# Patient Record
Sex: MALE | Race: WHITE | NOT HISPANIC OR LATINO | URBAN - METROPOLITAN AREA
[De-identification: names, ages, dates, MRNs, and addresses within clinical notes are randomized per-mention and may not be internally consistent; named-entity substitution may affect disease eponyms.]

---

## 2024-02-19 DIAGNOSIS — N50.82 SCROTAL PAIN: ICD-10-CM

## 2024-02-19 DIAGNOSIS — N45.1 EPIDIDYMITIS: Primary | ICD-10-CM

## 2024-02-19 RX ORDER — MELOXICAM 15 MG/1
15 TABLET ORAL DAILY
Qty: 30 TABLET | Refills: 1 | Status: SHIPPED | OUTPATIENT
Start: 2024-02-19 | End: 2024-04-19

## 2024-02-19 RX ORDER — LEVOFLOXACIN 500 MG/1
500 TABLET, FILM COATED ORAL DAILY
Qty: 10 TABLET | Refills: 0 | Status: SHIPPED | OUTPATIENT
Start: 2024-02-19 | End: 2024-02-29

## 2024-06-17 ENCOUNTER — APPOINTMENT (OUTPATIENT)
Dept: ORTHOPEDIC SURGERY | Facility: CLINIC | Age: 24
End: 2024-06-17
Payer: COMMERCIAL

## 2024-06-17 DIAGNOSIS — S92.352A CLOSED FRACTURE OF BASE OF FIFTH METATARSAL BONE OF LEFT FOOT, INITIAL ENCOUNTER: Primary | ICD-10-CM

## 2024-06-17 PROCEDURE — 99204 OFFICE O/P NEW MOD 45 MIN: CPT | Performed by: EMERGENCY MEDICINE

## 2024-06-17 ASSESSMENT — PAIN - FUNCTIONAL ASSESSMENT: PAIN_FUNCTIONAL_ASSESSMENT: 0-10

## 2024-06-17 ASSESSMENT — PAIN SCALES - GENERAL: PAINLEVEL_OUTOF10: 0 - NO PAIN

## 2024-06-17 NOTE — PROGRESS NOTES
Subjective    Patient ID: Darci Eastman is a 24 y.o. male.    Chief Complaint: Fracture of the Left Foot (NVP - jumped and didn't land odd but felt a sharp pain.  )     Last Surgery: No surgery found  Last Surgery Date: No surgery found    Darci is a very pleasant 24-year-old male who goes to school at [employer] and plays ultimate Frisbee.  He is coming in with some acute on chronic symptoms in his left foot.  He states that all of this began with an injury back in October.  At that time he had x-rays that revealed a possible fracture to his proximal left fifth metatarsal.  He was not really having much pain at that time and he was not immobilized or treated for 1/5 metatarsal fracture.  He then had repeat imaging done after another injury back in January of this year and the radiologist remarked that he had what appeared to be a remote Neff fracture involving his left fifth metatarsal.  Because of the chronicity, the emergency medicine provider reportedly told him that the fracture was old and that there was no acute intervention needed.  Again he was not immobilized and instead followed up with his school .  Since that time he has been doing some physical therapy and taping.  He has pain especially with playing on hard surfaces that is located over the base of the fifth metatarsal.  Last Friday, on 6/14/2024, he jumped up and landed awkwardly on his foot and had sudden onset sharp pain at that same site along the left fifth metatarsal.  He had x-rays performed in urgent care and was placed in a boot and told to be nonweightbearing after the diagnosed him again with a proximal left fifth metatarsal fracture.  He is here today with his dad who is helping provide additional history.  He has some mild bruising and swelling and still has some pain at the fracture site but states that overall he is feeling better.  He is leaving for [place] on Wednesday for 1 week for vacation.        Objective    Ortho Exam    Image Results:  Both ankles are unremarkable.  The right foot is unremarkable.  The left foot demonstrates some mild swelling especially over the dorsal lateral aspect and he also has some faint bruising seen over the lateral aspect of the left foot.  He is tender along the proximal fifth metatarsal.  The Lisfranc joint is nontender and appears to be stable.  DP pulse intact.  Range of motion of the left ankle is intact and strength testing also seems to be intact but pain is reproduced with testing strength against resistance dorsiflexion and testing his peroneal muscles.  Skin intact.    Assessment/Plan   Encounter Diagnoses:  Closed fracture of base of fifth metatarsal bone of left foot, initial encounter    Orders Placed This Encounter    MR foot left wo IV contrast     No follow-ups on file.    We had a discussion about his treatment options and agreed to obtain an MRI to better assess the acuity of his injury and whether or not this is a Neff versus a metatarsal shaft fracture.  Depending on the results of the MRI he will either follow-up with me for nonoperative management or I will likely refer him to Dr. Shaw.  In the meantime the patient will remain in the boot and will be nonweightbearing until after his MRI.    ** Please excuse any errors in grammar or translation related to this dictation. Voice recognition software was utilized to prepare this document. **       Carl Chan MD  [facility]

## 2024-06-18 ENCOUNTER — HOSPITAL ENCOUNTER (OUTPATIENT)
Dept: RADIOLOGY | Facility: HOSPITAL | Age: 24
Discharge: HOME | End: 2024-06-18
Payer: COMMERCIAL

## 2024-06-18 DIAGNOSIS — S92.352A CLOSED FRACTURE OF BASE OF FIFTH METATARSAL BONE OF LEFT FOOT, INITIAL ENCOUNTER: ICD-10-CM

## 2024-06-18 PROCEDURE — 73718 MRI LOWER EXTREMITY W/O DYE: CPT | Mod: LEFT SIDE | Performed by: RADIOLOGY

## 2024-06-18 PROCEDURE — 73718 MRI LOWER EXTREMITY W/O DYE: CPT | Mod: LT

## 2024-06-20 ENCOUNTER — TELEPHONE (OUTPATIENT)
Dept: ORTHOPEDIC SURGERY | Facility: CLINIC | Age: 24
End: 2024-06-20
Payer: COMMERCIAL

## 2024-06-28 ENCOUNTER — HOSPITAL ENCOUNTER (OUTPATIENT)
Dept: RADIOLOGY | Facility: CLINIC | Age: 24
Discharge: HOME | End: 2024-06-28
Payer: COMMERCIAL

## 2024-06-28 ENCOUNTER — OFFICE VISIT (OUTPATIENT)
Dept: ORTHOPEDIC SURGERY | Facility: CLINIC | Age: 24
End: 2024-06-28
Payer: COMMERCIAL

## 2024-06-28 DIAGNOSIS — S92.352A CLOSED FRACTURE OF BASE OF FIFTH METATARSAL BONE OF LEFT FOOT, INITIAL ENCOUNTER: ICD-10-CM

## 2024-06-28 DIAGNOSIS — S92.352A CLOSED FRACTURE OF BASE OF FIFTH METATARSAL BONE OF LEFT FOOT, INITIAL ENCOUNTER: Primary | ICD-10-CM

## 2024-06-28 PROBLEM — S92.353A CLOSED FRACTURE OF BASE OF FIFTH METATARSAL BONE: Status: ACTIVE | Noted: 2024-06-28

## 2024-06-28 PROCEDURE — 73630 X-RAY EXAM OF FOOT: CPT | Mod: LT

## 2024-06-28 PROCEDURE — 99214 OFFICE O/P EST MOD 30 MIN: CPT | Performed by: STUDENT IN AN ORGANIZED HEALTH CARE EDUCATION/TRAINING PROGRAM

## 2024-06-28 PROCEDURE — 99204 OFFICE O/P NEW MOD 45 MIN: CPT | Performed by: STUDENT IN AN ORGANIZED HEALTH CARE EDUCATION/TRAINING PROGRAM

## 2024-06-28 RX ORDER — SODIUM CHLORIDE, SODIUM LACTATE, POTASSIUM CHLORIDE, CALCIUM CHLORIDE 600; 310; 30; 20 MG/100ML; MG/100ML; MG/100ML; MG/100ML
100 INJECTION, SOLUTION INTRAVENOUS CONTINUOUS
OUTPATIENT
Start: 2024-06-28

## 2024-06-28 RX ORDER — CEFAZOLIN SODIUM 2 G/100ML
2 INJECTION, SOLUTION INTRAVENOUS ONCE
OUTPATIENT
Start: 2024-06-28 | End: 2024-06-28

## 2024-06-28 ASSESSMENT — PAIN - FUNCTIONAL ASSESSMENT: PAIN_FUNCTIONAL_ASSESSMENT: 0-10

## 2024-06-28 ASSESSMENT — PAIN SCALES - GENERAL: PAINLEVEL_OUTOF10: 0 - NO PAIN

## 2024-06-28 NOTE — PROGRESS NOTES
ORTHOPAEDIC SURGERY HISTORY & PHYSICAL     Chief Complaint:  Left foot pain    History Of Present Illness  Darci Eastman is a 24 y.o. male who presents for evaluation of left foot pain as a referral from Dr. Chan.  Patient initially sustained an injury to his left foot and October 2023.  Portions of the history are provided both by the patient as well as EMR and review of care everywhere.  This was treated conservatively.  Patient was later seen in January 2024 with mild persistent pain.  He was then seen on 06/17/2024 for acute on chronic left foot pain.  He sustained a awkward landing while playing ultimate Frisbee from 06/14/2024.  He actually thinks he was going up for the [id number] when he noticed pain.  He was seen in an urgent care setting time and made nonweightbearing in his left lower extremity in a fracture boot.    Patient was intermittently treated for presumed peroneal tendinitis after attending a walk-in therapy student clinic at [employer].  He has been able to continue playing but has noticed a nagging pain.  He has been able to play through the pain.    He currently reports minimal with pain nonweightbearing.  He has been compliant with nonweightbearing restrictions in a tall walking boot.  He has had a recent MRI and is here for review.    Patient does not use tobacco products.  He has recently graduated and is on the job hunt.     Past Medical History  No past medical history on file.    Surgical History  Recent Surgeries in Orthopaedic Surgery            No cases to display             Social History  Social History     Socioeconomic History    Marital status: Single     Spouse name: Not on file    Number of children: Not on file    Years of education: Not on file    Highest education level: Not on file   Occupational History    Not on file   Tobacco Use    Smoking status: Not on file    Smokeless tobacco: Not on file   Substance and Sexual Activity    Alcohol use: Not on file    Drug use: Not  on file    Sexual activity: Not on file   Other Topics Concern    Not on file   Social History Narrative    Not on file     Social Determinants of Health     Financial Resource Strain: Not on file   Food Insecurity: Not on file   Transportation Needs: Not on file   Physical Activity: Not on file   Stress: Not on file   Social Connections: Not on file   Intimate Partner Violence: Not on file   Housing Stability: Not on file       Family History  No family history on file.     Allergies  No Known Allergies    Review of Systems  REVIEW OF SYSTEMS  Constitutional: no unplanned weight loss  Psychiatric: no suicidal ideation  ENT: no vision changes, no sinus problems  Pulmonary: no shortness of breath  Lymphatic: no enlarged lymph nodes  Cardiovascular: no chest pain or shortness of breath  Gastrointestinal: no stomach problems  Genitourinary: no dysuria   Skin: no rashes  Endocrine: no thyroid problems  Neurological: no headache, no numbness  Hematological: no easy bruising  Musculoskeletal: Left foot pain    Physical Exam  PHYSICAL EXAMINATION  Constitutional Exam: well developed and well nourished  Psychiatric Exam: alert and oriented, appropriate mood and behavior  Eye Exam: EOMI  Pulmonary Exam: breathing non-labored, no apparent distress  Lymphatic exam: no appreciable lymphadenopathy in the lower extremities  Cardiovascular exam: RRR to peripheral palpation, DP pulses 2+, PT 2+, toes are pink with good capillary refill, no pitting edema  Skin exam: no open lesions, rashes, abrasions or ulcerations  Neurological exam: sensation to light touch intact in both lower extremities in peripheral and dermatomal distributions (except for any abnormalities noted in musculoskeletal exam)    Musculoskeletal exam: Left lower extremity examination.  Pain localized to the lateral aspect of the fifth metatarsal.  He has obvious bossing and focal tenderness to palpation.  Patient has physiologic hindfoot valgus with pes planus arch  posture and no significant forefoot deformity noted.  Patient has pain-free and supple ankle, subtalar midtarsal joint range of motion.  Patient has sensation intact to light touch grossly in a saphenous, sural, superficial peroneal, deep peroneal and tibial nerve distribution. He has intact PF/DF/EHL. 2+ Dp/pt pulses.    Last Recorded Vitals  There were no vitals taken for this visit.    Laboratory Results    No results found for this or any previous visit (from the past 24 hour(s)).    Radiology Results   X-ray imaging 3 view weightbearing left foot reviewed from 06/20/2024 and independently evaluated by me demonstrates complete and nondisplaced fifth metatarsal fracture with presumed periosteal reaction of the lateral cortex indicative of stress injury.    MRI left foot without contrast reviewed from 06/18/2024 and independently evaluated by me demonstrates incomplete proximal fifth metatarsal fracture with diffuse marrow signal.    Assessment/Plan:  24-year-old male who my impression has left closed complete nondisplaced fifth metatarsal fracture, likely sequela of prior stress injury with recent exacerbation.  I have reviewed the diagnosis and treatment options extensively with the patient.  I recommend the patient continue nonweightbearing in his left lower extremity.  The patient has failed an exhaustive and appropriate course of nonoperative treatment including previous attempts for immobilization without healing and persistent and nagging pain.  I would recommend the patient undergo left ORIF fifth metatarsal with bone grafting.  I reviewed the risk, benefits and alternatives to surgery, risks include but are not limited to infection, wound healing complications, need for further surgery, persistent or worsening pain, postoperative stiffness, bleeding, blood loss requiring a blood transfusion, neurovascular injury, mal- or non-union, recurrent deformity, hardware failure, hardware pain, failure of the  procedure, complications of anesthesia, stroke, DVT/PE, myocardial infarction and death. Benefits included decreased pain, improved function and improved likelihood of fracture healing. Alternatives included continued conservative management likely with a bone stimulator. The patient voiced understanding of the risks, benefits and alternatives and agreed to go forward with the stated plan.    Surgical Discussion/Plan    We discussed the diagnosis, prognosis, and all treatment options including those non-operative and surgical, along with respective risks/benefits/recovery.  The patient will benefit from surgical reduction and repair/ORIF of this injury for optimal return to function/activity.  The patient would like to pursue surgical intervention.     We discussed the in's and out's of surgery.  It would be done as ambulatory/SDS status, anesthesia: Regional + GETA, and pre-op testing: PAT visit to be setup.     A significant amount of time was also spent in counseling and coordination of care activities, which specifically included discussing/counseling the patient/family on the above specifics of surgery/risks/benefits/goals/expectations/recovery process and alternative treatments as detailed above and any other necessary post-op planning needed to ensure excellent patient care and patient safety.    Jamshid Montgomery MD, LAURA  Department of Orthopaedic Surgery  [facility]

## 2024-06-28 NOTE — LETTER
June 28, 2024     Cindi Laird MD  [address]  [address]  [facility] [address]    Patient: Darci Eastman   YOB: 2000   Date of Visit: 6/28/2024       Dear Dr. Cindi Laird MD:    Thank you for referring Darci Eastman to me for evaluation. Below are my notes for this consultation.  If you have questions, please do not hesitate to call me. I look forward to following your patient along with you.       Sincerely,     Jamshid Montgomery MD      CC: Carl Chan MD  ______________________________________________________________________________________    ORTHOPAEDIC SURGERY HISTORY & PHYSICAL     Chief Complaint:  Left foot pain    History Of Present Illness  Darci Eastman is a 24 y.o. male who presents for evaluation of left foot pain as a referral from Dr. Chan.  Patient initially sustained an injury to his left foot and October 2023.  Portions of the history are provided both by the patient as well as EMR and review of care everywhere.  This was treated conservatively.  Patient was later seen in January 2024 with mild persistent pain.  He was then seen on 06/17/2024 for acute on chronic left foot pain.  He sustained a awkward landing while playing ultimate Frisbee from 06/14/2024.  He actually thinks he was going up for the [employer] when he noticed pain.  He was seen in an urgent care setting time and made nonweightbearing in his left lower extremity in a fracture boot.    Patient was intermittently treated for presumed peroneal tendinitis after attending a walk-in therapy student clinic at [employer] [facility].  He has been able to continue playing but has noticed a nagging pain.  He has been able to play through the pain.    He currently reports minimal with pain nonweightbearing.  He has been compliant with nonweightbearing restrictions in a tall walking boot.  He has had a recent MRI and is here for review.    Patient does not use tobacco products.  He has recently graduated and  is on the job hunt.     Past Medical History  No past medical history on file.    Surgical History  Recent Surgeries in Orthopaedic Surgery            No cases to display             Social History  Social History     Socioeconomic History   • Marital status: Single     Spouse name: Not on file   • Number of children: Not on file   • Years of education: Not on file   • Highest education level: Not on file   Occupational History   • Not on file   Tobacco Use   • Smoking status: Not on file   • Smokeless tobacco: Not on file   Substance and Sexual Activity   • Alcohol use: Not on file   • Drug use: Not on file   • Sexual activity: Not on file   Other Topics Concern   • Not on file   Social History Narrative   • Not on file     Social Determinants of Health     Financial Resource Strain: Not on file   Food Insecurity: Not on file   Transportation Needs: Not on file   Physical Activity: Not on file   Stress: Not on file   Social Connections: Not on file   Intimate Partner Violence: Not on file   Housing Stability: Not on file       Family History  No family history on file.     Allergies  No Known Allergies    Review of Systems  REVIEW OF SYSTEMS  Constitutional: no unplanned weight loss  Psychiatric: no suicidal ideation  ENT: no vision changes, no sinus problems  Pulmonary: no shortness of breath  Lymphatic: no enlarged lymph nodes  Cardiovascular: no chest pain or shortness of breath  Gastrointestinal: no stomach problems  Genitourinary: no dysuria   Skin: no rashes  Endocrine: no thyroid problems  Neurological: no headache, no numbness  Hematological: no easy bruising  Musculoskeletal: Left foot pain    Physical Exam  PHYSICAL EXAMINATION  Constitutional Exam: well developed and well nourished  Psychiatric Exam: alert and oriented, appropriate mood and behavior  Eye Exam: EOMI  Pulmonary Exam: breathing non-labored, no apparent distress  Lymphatic exam: no appreciable lymphadenopathy in the lower  extremities  Cardiovascular exam: RRR to peripheral palpation, DP pulses 2+, PT 2+, toes are pink with good capillary refill, no pitting edema  Skin exam: no open lesions, rashes, abrasions or ulcerations  Neurological exam: sensation to light touch intact in both lower extremities in peripheral and dermatomal distributions (except for any abnormalities noted in musculoskeletal exam)    Musculoskeletal exam: Left lower extremity examination.  Pain localized to the lateral aspect of the fifth metatarsal.  He has obvious bossing and focal tenderness to palpation.  Patient has physiologic hindfoot valgus with pes planus arch posture and no significant forefoot deformity noted.  Patient has pain-free and supple ankle, subtalar midtarsal joint range of motion.  Patient has sensation intact to light touch grossly in a saphenous, sural, superficial peroneal, deep peroneal and tibial nerve distribution. He has intact PF/DF/EHL. 2+ Dp/pt pulses.    Last Recorded Vitals  There were no vitals taken for this visit.    Laboratory Results    No results found for this or any previous visit (from the past 24 hour(s)).    Radiology Results   X-ray imaging 3 view weightbearing left foot reviewed from 06/20/2024 and independently evaluated by me demonstrates complete and nondisplaced fifth metatarsal fracture with presumed periosteal reaction of the lateral cortex indicative of stress injury.    MRI left foot without contrast reviewed from 06/18/2024 and independently evaluated by me demonstrates incomplete proximal fifth metatarsal fracture with diffuse marrow signal.    Assessment/Plan:  24-year-old male who my impression has left closed complete nondisplaced fifth metatarsal fracture, likely sequela of prior stress injury with recent exacerbation.  I have reviewed the diagnosis and treatment options extensively with the patient.  I recommend the patient continue nonweightbearing in his left lower extremity.  The patient has failed  an exhaustive and appropriate course of nonoperative treatment including previous attempts for immobilization without healing and persistent and nagging pain.  I would recommend the patient undergo left ORIF fifth metatarsal with bone grafting.  I reviewed the risk, benefits and alternatives to surgery, risks include but are not limited to infection, wound healing complications, need for further surgery, persistent or worsening pain, postoperative stiffness, bleeding, blood loss requiring a blood transfusion, neurovascular injury, mal- or non-union, recurrent deformity, hardware failure, hardware pain, failure of the procedure, complications of anesthesia, stroke, DVT/PE, myocardial infarction and death. Benefits included decreased pain, improved function and improved likelihood of fracture healing. Alternatives included continued conservative management likely with a bone stimulator. The patient voiced understanding of the risks, benefits and alternatives and agreed to go forward with the stated plan.    Surgical Discussion/Plan    We discussed the diagnosis, prognosis, and all treatment options including those non-operative and surgical, along with respective risks/benefits/recovery.  The patient will benefit from surgical reduction and repair/ORIF of this injury for optimal return to function/activity.  The patient would like to pursue surgical intervention.     We discussed the in's and out's of surgery.  It would be done as ambulatory/SDS status, anesthesia: Regional + GETA, and pre-op testing: PAT visit to be setup.     A significant amount of time was also spent in counseling and coordination of care activities, which specifically included discussing/counseling the patient/family on the above specifics of surgery/risks/benefits/goals/expectations/recovery process and alternative treatments as detailed above and any other necessary post-op planning needed to ensure excellent patient care and patient  safety.    Jamshid Montgomery MD, LAURA  Department of Orthopaedic Surgery  [facility]

## 2024-07-08 ENCOUNTER — CLINICAL SUPPORT (OUTPATIENT)
Dept: PREADMISSION TESTING | Facility: HOSPITAL | Age: 24
End: 2024-07-08
Payer: COMMERCIAL

## 2024-07-08 DIAGNOSIS — S92.352A CLOSED FRACTURE OF BASE OF FIFTH METATARSAL BONE OF LEFT FOOT, INITIAL ENCOUNTER: ICD-10-CM

## 2024-07-08 RX ORDER — FERROUS SULFATE, DRIED 160(50) MG
1 TABLET, EXTENDED RELEASE ORAL DAILY
COMMUNITY

## 2024-07-10 ENCOUNTER — LAB (OUTPATIENT)
Dept: LAB | Facility: LAB | Age: 24
End: 2024-07-10
Payer: COMMERCIAL

## 2024-07-10 ENCOUNTER — TELEPHONE (OUTPATIENT)
Dept: ORTHOPEDIC SURGERY | Facility: HOSPITAL | Age: 24
End: 2024-07-10

## 2024-07-10 ENCOUNTER — PRE-ADMISSION TESTING (OUTPATIENT)
Dept: PREADMISSION TESTING | Facility: HOSPITAL | Age: 24
End: 2024-07-10
Payer: COMMERCIAL

## 2024-07-10 VITALS
DIASTOLIC BLOOD PRESSURE: 72 MMHG | RESPIRATION RATE: 18 BRPM | WEIGHT: 187.39 LBS | HEIGHT: 75 IN | SYSTOLIC BLOOD PRESSURE: 112 MMHG | HEART RATE: 75 BPM | TEMPERATURE: 98.4 F | OXYGEN SATURATION: 97 % | BODY MASS INDEX: 23.3 KG/M2

## 2024-07-10 DIAGNOSIS — Z01.818 PREOP TESTING: Primary | ICD-10-CM

## 2024-07-10 DIAGNOSIS — Z01.818 PREOP TESTING: ICD-10-CM

## 2024-07-10 LAB
BASOPHILS # BLD AUTO: 0.03 X10*3/UL (ref 0–0.1)
BASOPHILS NFR BLD AUTO: 0.8 %
EOSINOPHIL # BLD AUTO: 0.06 X10*3/UL (ref 0–0.7)
EOSINOPHIL NFR BLD AUTO: 1.6 %
ERYTHROCYTE [DISTWIDTH] IN BLOOD BY AUTOMATED COUNT: 11.7 % (ref 11.5–14.5)
HCT VFR BLD AUTO: 46 % (ref 41–52)
HGB BLD-MCNC: 15.4 G/DL (ref 13.5–17.5)
IMM GRANULOCYTES # BLD AUTO: 0 X10*3/UL (ref 0–0.7)
IMM GRANULOCYTES NFR BLD AUTO: 0 % (ref 0–0.9)
LYMPHOCYTES # BLD AUTO: 1.49 X10*3/UL (ref 1.2–4.8)
LYMPHOCYTES NFR BLD AUTO: 39.1 %
MCH RBC QN AUTO: 30 PG (ref 26–34)
MCHC RBC AUTO-ENTMCNC: 33.5 G/DL (ref 32–36)
MCV RBC AUTO: 90 FL (ref 80–100)
MONOCYTES # BLD AUTO: 0.48 X10*3/UL (ref 0.1–1)
MONOCYTES NFR BLD AUTO: 12.6 %
NEUTROPHILS # BLD AUTO: 1.75 X10*3/UL (ref 1.2–7.7)
NEUTROPHILS NFR BLD AUTO: 45.9 %
NRBC BLD-RTO: 0 /100 WBCS (ref 0–0)
PLATELET # BLD AUTO: 354 X10*3/UL (ref 150–450)
RBC # BLD AUTO: 5.14 X10*6/UL (ref 4.5–5.9)
WBC # BLD AUTO: 3.8 X10*3/UL (ref 4.4–11.3)

## 2024-07-10 PROCEDURE — 87081 CULTURE SCREEN ONLY: CPT | Mod: AHULAB | Performed by: PHYSICIAN ASSISTANT

## 2024-07-10 PROCEDURE — 85025 COMPLETE CBC W/AUTO DIFF WBC: CPT

## 2024-07-10 PROCEDURE — 99204 OFFICE O/P NEW MOD 45 MIN: CPT | Performed by: PHYSICIAN ASSISTANT

## 2024-07-10 PROCEDURE — 36415 COLL VENOUS BLD VENIPUNCTURE: CPT

## 2024-07-10 RX ORDER — CHLORHEXIDINE GLUCONATE ORAL RINSE 1.2 MG/ML
SOLUTION DENTAL
Qty: 473 ML | Refills: 0 | Status: SHIPPED | OUTPATIENT
Start: 2024-07-10

## 2024-07-10 ASSESSMENT — ENCOUNTER SYMPTOMS
CONSTITUTIONAL NEGATIVE: 1
ALLERGIC/IMMUNOLOGIC NEGATIVE: 1
CARDIOVASCULAR NEGATIVE: 1
HEMATOLOGIC/LYMPHATIC NEGATIVE: 1
NEUROLOGICAL NEGATIVE: 1
PSYCHIATRIC NEGATIVE: 1
ENDOCRINE NEGATIVE: 1
EYES NEGATIVE: 1
GASTROINTESTINAL NEGATIVE: 1
RESPIRATORY NEGATIVE: 1
MUSCULOSKELETAL NEGATIVE: 1

## 2024-07-10 NOTE — PREPROCEDURE INSTRUCTIONS
Medication List            Accurate as of July 10, 2024  1:23 PM. Always use your most recent med list.                calcium carbonate-vitamin D3 500 mg-5 mcg (200 unit) tablet  Medication Adjustments for Surgery: Continue until night before surgery                 Concerning above medication instructions - If medication is normally taken at night continue normal schedule - do not take night prior and morning of surgery.     CONTACT SURGEON'S OFFICE IF YOU DEVELOP:  * Fever = 100.4 F   * New respiratory symptoms (e.g. cough, shortness of breath, respiratory distress, sore throat)  * Recent loss of taste or smell  *Flu like symptoms such as headache, fatigue or gastrointestinal symptoms  * You develop any open sores, shingles, burning or painful urination   AND/OR:  * You no longer wish to have the surgery.  * Any other personal circumstances change that may lead to the need to cancel or defer this surgery.  *You were admitted to any hospital within one week of your planned procedure.    SMOKING:  *Quitting smoking can make a huge difference to your health and recovery from surgery.    *If you need help with quitting, call 2-453-QUIT-NOW.    THE DAY BEFORE SURGERY:  *Do not eat any food after midnight the night before surgery/procedure.   *You are permitted to drink clear liquids (i.e. water, black coffee/tea, (no milk or cream) apple juice, and electrolyte drinks (Gatorade)13.5 ounces up to 2 hours before your instructed arrival time to the hospital.  *You may chew gum until  2 hours before your surgery/procedure.    SURGICAL TIME  *You will be contacted between 2 p.m. and 6 p.m. the business day before your surgery with your arrival time.  *If you haven't received a call by 6pm, call [phone].  *Scheduled surgery times may change and you will be notified if this occurs-check your personal voicemail for any updates.    ON THE MORNING OF SURGERY:  *Wear comfortable, loose fitting clothing.   *Do not use  moisturizers, creams, lotions or perfume.  *All jewelry and valuables should be left at home.  *Prosthetic devices such as contact lenses, hearing aids, dentures, eyelash extensions, hairpins and body piercing must be removed before surgery.    BRING WITH YOU:  *Photo ID and insurance card  *Current list of medicines and allergies  *Pacemaker/Defibrillator/Heart stent cards  *CPAP machine and mask  *Slings/splints/crutches  *Copy of your complete Advanced Directive/DHPOA-if applicable  *Neurostimulator implant remote    PARKING AND ARRIVAL:  *Check in at the Main Entrance desk and let them know you are here for surgery.  *You will be directed to the 2nd floor surgical waiting area.    AFTER OUTPATIENT SURGERY:  *A responsible adult MUST accompany you at the time of discharge and stay with you for 24 hours after your surgery.  *You may NOT drive yourself home after surgery.  *You may use a taxi or ride sharing service ([employer], Uber) to return home ONLY if you are accompanied by a friend or family member.  *Instructions for resuming your medications will be provided by your surgeon.      Home Preoperative Antibacterial Shower     What is a home preoperative antibacterial shower?  This shower is a way of cleaning the skin with a germ killing soap before surgery.  The soap contains chlorhexidine, commonly known as CHG.  CHG is a soap for your skin with germ killing ability.  Let your doctor know if you are allergic to chlorhexidine.    Why do I need to take a preoperative antibacterial shower?  Skin is not sterile.  It is best to try to make your skin as free of germs as possible before surgery.  Proper cleansing with a germ killing soap before surgery can lower the number of germs on your skin.  This helps to reduce the risk of infection at the surgical site.  Following the instructions listed below will help you prepare your skin for surgery.      How do I use the CHG skin cleanser?  Steps:  Begin using your CHG soap  five days before your scheduled surgery on ________________________.    Days 1-4 Shower before bed:  Wash your face and genitals with your normal soap and rinse.    Wash and rinse your hair using the CHG soap. Rinse completely, do not condition your   Hair.          3.    Apply the CHG soap to a clean wet washcloth.  Turn the water off or move away                From the water spray to avoid premature rinsing of the CHG soap as you are applying.     4.   Lather your entire body from the neck down.  Do not use on your face or genitals.   Pay special attention to the area(s) where your incision(s) will be located unless they are on your face.  Avoid scrubbing your skin too hard.  The important point is to have the CHG soap sit on your skin for 3 minutes.    When the 3 minutes are up, turn on the water and rinse the CHG soap off your body completely.   Pat yourself dry with a clean, freshly-laundered towel.  Dress in clean, freshly laundered night clothes.    Be sure to sleep with clean, freshly laundered sheets.  Day 5:  Last shower is the morning before surgery: Follow above Instructions.    NOTE:    *Hair extensions should be removed    *Keep CHG soap out of eyes and ear canals   *DO NOT wash with regular soap on your body after you have used the CHG        soap solution  *DO NOT apply powders, lotions, or perfume.  *Deodorant may be used days 1-4, BUT NOT the day of surgery    Who should I contact if I have any questions regarding the use of CHG soap?  Call the [facility], Preadmission Testing at [phone] if you have any questions.              Patient Information: Pre-Operative Infection Prevention Measures     Why did I have my nose, under my arms and groin swabbed?  The purpose of the swab is to identify Staphylococcus aureus inside your nose or on your skin.  The swab was sent to the laboratory for culture.  A positive swab/culture for Staphylococcus aureus is called  colonization or carriage.      What is Staphylococcus aureus?  Staphylococcus aureus, also known as “staph”, is a germ found on the skin or in the nose of healthy people.  Sometimes Staphylococcus aureus can get into the body and cause an infection.  This can be minor (such as pimples, boils or other skin problems).  It might also be serious (such as blood infection, pneumonia or a surgical site infection).    What is Staphylococcus aureus colonization or carriage?  Colonization or carriage means that a person has the germ but is not sick from it.  These bacteria can be spread on the hands or when breathing or sneezing.    How is Staphylococcus aureus spread?  It is most often spread by close contact with a person or item that carries it.    What happens if my culture is positive for Staphylococcus aureus?  Your doctor/medical team will use this information to guide any antibiotic treatment which may be necessary.  Regardless of the culture results, we will clean the inside of your nose with a betadine swab just before you have your surgery.      Will I get an infection if I have Staphylococcus aureus in my nose or on my skin?  Anyone can get an infection with Staphylococcus aureus.  However, the best way to reduce your risk of infection is to follow the instructions provided to you for the use of your CHG soap and dental rinse.        Who should I contact if I have any questions?  Call the [facility], Preadmission Testing at [phone] if you have any questions.           Patient Information: Oral/Dental Rinse  **This is a prescription; pick it up at your preferred local pharmacy **  What is oral/dental rinse?   It is a mouthwash. It is a way of cleaning the mouth with a germ killing solution before your surgery.  The solution contains chlorhexidine, commonly known as CHG.   It is used inside the mouth to kill a bacteria known as Staphylococcus aureus.  Let your doctor know if you  are allergic to Chlorhexidine.    Why do I need to use CHG oral/dental rinse?  The CHG oral/dental rinse helps to kill a bacteria in your mouth known a Staphylococcus aureus.     This reduces the risk of infection at the surgical site.      Using your CHG oral/dental rinse  STEPS:  Use your CHG oral/dental rinse after you brush your teeth the night before (at bedtime) and the morning of your surgery.  Follow all directions on your prescription label.    Use the cap on the container to measure 15ml (fill cap to fill line)  Swish (gargle if you can) the mouthwash in your mouth for at least 30 seconds, (do not to swallow) spit out  After you use your CHG rinse, do not rinse your mouth with water, drink or eat.  Please refer to prescription label for the appropriate time to resume oral intake  Dental rinse comes in one size bottle: 473ml ~16oz.  You will have leftover    rinse, discard after this use.    What side effects might I have using the CHG oral/dental rinse?  CHG rinse will stick to plaque on the teeth.  Brush and floss just before use.  Teeth brushing will help avoid staining of plaque during use.    Who should I contact if I have questions about the CHG oral/dental rinse?  Please call [facility], Preadmission Testing at [phone] if you have any questions

## 2024-07-10 NOTE — CPM/PAT H&P
[facility] Evaluation       Name: Darci Eastman (Darci Eastman)  /Age: 2000/24 y.o.     In-Person       Chief Complaint: Closed fracture of base of fifth metatarsal bone of left foot, initial encounter     HPI      Date of Consult: 7/10/24    Referring Provider: Dr. Montgomery     Surgery, Date, and Length: Left Foot Fracture Open Reduction Internal Fixation; Calcaneal Bone Graft [place] - Left ; 7/15/24; 210 minutes     Darci Eastman  is a 24 year-old male who presents to the [facility] for perioperative risk assessment prior to surgery. Patient initially sustained an injury to his left foot and 2023.  This was treated conservatively.  Patient was later seen in 2024 with mild persistent pain.  He was then seen on 2024 for acute on chronic left foot pain.  He sustained a awkward landing while playing ultimate Frisbee from 2024.  He actually thinks he was going up for the [employer] when he noticed pain.  He was seen in an urgent care setting time and made nonweightbearing in his left lower extremity in a fracture boot. The patient has failed an exhaustive and appropriate course of nonoperative treatment including previous attempts for immobilization without healing and persistent and nagging pain.     This note was created in part upon personal review of patient's medical records.      Patient is scheduled to have Left Foot Fracture Open Reduction Internal Fixation; Calcaneal Bone Graft [place] - Left     Medical History  Past Medical History:   Diagnosis Date    Closed fracture of base of fifth metatarsal bone of left foot, initial encounter         STOP BANG =  1   (male)    Capisrraeli =  2    (surgery)    Surgical History  Past Surgical History:   Procedure Laterality Date    NO PAST SURGERIES               Family history:  Family History   Problem Relation Name Age of Onset    ALS Mother      Hypertension Father      No Known Problems Sister      No Known Problems Brother       "    Social history:  Social History     Socioeconomic History    Marital status: Single     Spouse name: Not on file    Number of children: Not on file    Years of education: Not on file    Highest education level: Not on file   Occupational History    Not on file   Tobacco Use    Smoking status: Never    Smokeless tobacco: Never   Vaping Use    Vaping status: Never Used   Substance and Sexual Activity    Alcohol use: Yes     Alcohol/week: 6.0 standard drinks of alcohol     Types: 6 Standard drinks or equivalent per week    Drug use: Yes     Types: Marijuana     Comment: last use April - no other drugs    Sexual activity: Defer   Other Topics Concern    Not on file   Social History Narrative    Not on file     Social Determinants of Health     Financial Resource Strain: Not on file   Food Insecurity: Not on file   Transportation Needs: Not on file   Physical Activity: Not on file   Stress: Not on file   Social Connections: Not on file   Intimate Partner Violence: Not on file   Housing Stability: Not on file          Current Outpatient Medications:     calcium carbonate-vitamin D3 500 mg-5 mcg (200 unit) tablet, Take 1 tablet by mouth once daily., Disp: , Rfl:     chlorhexidine (Peridex) 0.12 % solution, 15 ml swish and spit for 30 seconds night prior to surgery and morning of surgery, Disp: 473 mL, Rfl: 0       Visit Vitals  /72   Pulse 75   Temp 36.9 °C (98.4 °F)   Resp 18   Ht 1.905 m (6' 3\")   Wt 85 kg (187 lb 6.3 oz)   SpO2 97%   BMI 23.42 kg/m²   Smoking Status Never   BSA 2.12 m²              Review of Systems   Constitutional: Negative.    HENT: Negative.     Eyes: Negative.    Respiratory: Negative.     Cardiovascular: Negative.         METS >4    Gastrointestinal: Negative.    Endocrine: Negative.    Genitourinary: Negative.    Musculoskeletal: Negative.    Skin: Negative.    Allergic/Immunologic: Negative.    Neurological: Negative.    Hematological: Negative.    Psychiatric/Behavioral: Negative.   "        Physical Exam  Constitutional:       Appearance: Normal appearance.   HENT:      Head: Normocephalic and atraumatic.      Right Ear: External ear normal.      Left Ear: External ear normal.      Nose: Nose normal.      Mouth/Throat:      Pharynx: Oropharynx is clear.   Eyes:      Conjunctiva/sclera: Conjunctivae normal.   Cardiovascular:      Rate and Rhythm: Normal rate and regular rhythm.      Heart sounds: Normal heart sounds.   Pulmonary:      Effort: Pulmonary effort is normal.      Breath sounds: Normal breath sounds.   Abdominal:      General: Abdomen is flat.      Palpations: Abdomen is soft.   Musculoskeletal:         General: Normal range of motion.      Cervical back: Normal range of motion and neck supple.      Comments: Boot LLE   Skin:     General: Skin is warm and dry.   Neurological:      General: No focal deficit present.      Mental Status: He is alert and oriented to person, place, and time.   Psychiatric:         Mood and Affect: Mood normal.         Behavior: Behavior normal.         Thought Content: Thought content normal.         Judgment: Judgment normal.          PAT AIRWAY:   Airway:     Mallampati::  I    Neck ROM::  Full    Lab Results   Component Value Date    WBC 3.8 (L) 07/10/2024    HGB 15.4 07/10/2024    HCT 46.0 07/10/2024    MCV 90 07/10/2024     07/10/2024      RCRI  0  , 3.9 % Risk of MACE      Hematology       Patient instructed to ambulate as soon as possible postoperatively to decrease thromboembolic risk.      Initiate mechanical DVT prophylaxis as soon as possible and initiate chemical prophylaxis when deemed safe from a bleeding standpoint post surgery.       VTE prophylaxis per surgical team       Tests ordered in PAT: cbc  LAB REVIEWED: unremarkable    Risk assessment complete.  Patient is scheduled for a intermediate surgical risk procedure.        Preoperative medication instructions were provided and reviewed with the patient.  Any additional testing or  evaluation was explained to the patient.  Nothing by mouth instructions were discussed and patient's questions were answered prior to conclusion to this encounter.  Patient verbalized understanding of preoperative instructions given in preadmission testing; discharge instructions available in EMR.    This note was dictated by a speech recognition.  Minor errors may have been detected in a speech recognition.

## 2024-07-10 NOTE — TELEPHONE ENCOUNTER
I received notification the patient was requesting a call back in the lead up to his upcoming surgery.  I contacted the patient via the number listed in the chart.  I reviewed the typical postoperative recovery course, I would anticipate putting him into a nonweightbearing splint for soft tissue rest and pending wound check transitioning him into a nonweightbearing walking boot so that he may begin physical therapy for early range of motion.  We also discussed vitamin D and calcium supplementation, I will plan to provide him with a formal prescription following surgery.  I have encouraged the patient to contact the office with any further questions prior to surgery.    Jamshid Montgomery MD, LAURA  Department of Orthopaedic Surgery  [facility]

## 2024-07-10 NOTE — H&P (VIEW-ONLY)
[facility] Evaluation       Name: Darci Eastman (Darci Eastman)  /Age: 2000/24 y.o.     In-Person       Chief Complaint: Closed fracture of base of fifth metatarsal bone of left foot, initial encounter     HPI      Date of Consult: 7/10/24    Referring Provider: Dr. Montgomery     Surgery, Date, and Length: Left Foot Fracture Open Reduction Internal Fixation; Calcaneal Bone Graft [place] - Left ; 7/15/24; 210 minutes     Darci Eastman  is a 24 year-old male who presents to the [facility] for perioperative risk assessment prior to surgery. Patient initially sustained an injury to his left foot and 2023.  This was treated conservatively.  Patient was later seen in 2024 with mild persistent pain.  He was then seen on 2024 for acute on chronic left foot pain.  He sustained a awkward landing while playing ultimate Frisbee from 2024.  He actually thinks he was going up for the [employer] when he noticed pain.  He was seen in an urgent care setting time and made nonweightbearing in his left lower extremity in a fracture boot. The patient has failed an exhaustive and appropriate course of nonoperative treatment including previous attempts for immobilization without healing and persistent and nagging pain.     This note was created in part upon personal review of patient's medical records.      Patient is scheduled to have Left Foot Fracture Open Reduction Internal Fixation; Calcaneal Bone Graft [place] - Left     Medical History  Past Medical History:   Diagnosis Date    Closed fracture of base of fifth metatarsal bone of left foot, initial encounter         STOP BANG =  1   (male)    Capisrraeli =  2    (surgery)    Surgical History  Past Surgical History:   Procedure Laterality Date    NO PAST SURGERIES               Family history:  Family History   Problem Relation Name Age of Onset    ALS Mother      Hypertension Father      No Known Problems Sister      No Known Problems Brother       "    Social history:  Social History     Socioeconomic History    Marital status: Single     Spouse name: Not on file    Number of children: Not on file    Years of education: Not on file    Highest education level: Not on file   Occupational History    Not on file   Tobacco Use    Smoking status: Never    Smokeless tobacco: Never   Vaping Use    Vaping status: Never Used   Substance and Sexual Activity    Alcohol use: Yes     Alcohol/week: 6.0 standard drinks of alcohol     Types: 6 Standard drinks or equivalent per week    Drug use: Yes     Types: Marijuana     Comment: last use April - no other drugs    Sexual activity: Defer   Other Topics Concern    Not on file   Social History Narrative    Not on file     Social Determinants of Health     Financial Resource Strain: Not on file   Food Insecurity: Not on file   Transportation Needs: Not on file   Physical Activity: Not on file   Stress: Not on file   Social Connections: Not on file   Intimate Partner Violence: Not on file   Housing Stability: Not on file          Current Outpatient Medications:     calcium carbonate-vitamin D3 500 mg-5 mcg (200 unit) tablet, Take 1 tablet by mouth once daily., Disp: , Rfl:     chlorhexidine (Peridex) 0.12 % solution, 15 ml swish and spit for 30 seconds night prior to surgery and morning of surgery, Disp: 473 mL, Rfl: 0       Visit Vitals  /72   Pulse 75   Temp 36.9 °C (98.4 °F)   Resp 18   Ht 1.905 m (6' 3\")   Wt 85 kg (187 lb 6.3 oz)   SpO2 97%   BMI 23.42 kg/m²   Smoking Status Never   BSA 2.12 m²              Review of Systems   Constitutional: Negative.    HENT: Negative.     Eyes: Negative.    Respiratory: Negative.     Cardiovascular: Negative.         METS >4    Gastrointestinal: Negative.    Endocrine: Negative.    Genitourinary: Negative.    Musculoskeletal: Negative.    Skin: Negative.    Allergic/Immunologic: Negative.    Neurological: Negative.    Hematological: Negative.    Psychiatric/Behavioral: Negative.   "        Physical Exam  Constitutional:       Appearance: Normal appearance.   HENT:      Head: Normocephalic and atraumatic.      Right Ear: External ear normal.      Left Ear: External ear normal.      Nose: Nose normal.      Mouth/Throat:      Pharynx: Oropharynx is clear.   Eyes:      Conjunctiva/sclera: Conjunctivae normal.   Cardiovascular:      Rate and Rhythm: Normal rate and regular rhythm.      Heart sounds: Normal heart sounds.   Pulmonary:      Effort: Pulmonary effort is normal.      Breath sounds: Normal breath sounds.   Abdominal:      General: Abdomen is flat.      Palpations: Abdomen is soft.   Musculoskeletal:         General: Normal range of motion.      Cervical back: Normal range of motion and neck supple.      Comments: Boot LLE   Skin:     General: Skin is warm and dry.   Neurological:      General: No focal deficit present.      Mental Status: He is alert and oriented to person, place, and time.   Psychiatric:         Mood and Affect: Mood normal.         Behavior: Behavior normal.         Thought Content: Thought content normal.         Judgment: Judgment normal.          PAT AIRWAY:   Airway:     Mallampati::  I    Neck ROM::  Full    Lab Results   Component Value Date    WBC 3.8 (L) 07/10/2024    HGB 15.4 07/10/2024    HCT 46.0 07/10/2024    MCV 90 07/10/2024     07/10/2024      RCRI  0  , 3.9 % Risk of MACE      Hematology       Patient instructed to ambulate as soon as possible postoperatively to decrease thromboembolic risk.      Initiate mechanical DVT prophylaxis as soon as possible and initiate chemical prophylaxis when deemed safe from a bleeding standpoint post surgery.       VTE prophylaxis per surgical team       Tests ordered in PAT: cbc  LAB REVIEWED: unremarkable    Risk assessment complete.  Patient is scheduled for a intermediate surgical risk procedure.        Preoperative medication instructions were provided and reviewed with the patient.  Any additional testing or  evaluation was explained to the patient.  Nothing by mouth instructions were discussed and patient's questions were answered prior to conclusion to this encounter.  Patient verbalized understanding of preoperative instructions given in preadmission testing; discharge instructions available in EMR.    This note was dictated by a speech recognition.  Minor errors may have been detected in a speech recognition.

## 2024-07-12 ENCOUNTER — TELEPHONE (OUTPATIENT)
Dept: PREADMISSION TESTING | Facility: HOSPITAL | Age: 24
End: 2024-07-12
Payer: COMMERCIAL

## 2024-07-12 LAB — STAPHYLOCOCCUS SPEC CULT: ABNORMAL

## 2024-07-12 RX ORDER — MUPIROCIN 20 MG/G
OINTMENT TOPICAL 2 TIMES DAILY
Qty: 44 G | Refills: 0 | Status: SHIPPED | OUTPATIENT
Start: 2024-07-12 | End: 2024-07-22

## 2024-07-15 ENCOUNTER — APPOINTMENT (OUTPATIENT)
Dept: RADIOLOGY | Facility: HOSPITAL | Age: 24
End: 2024-07-15
Payer: COMMERCIAL

## 2024-07-15 ENCOUNTER — HOSPITAL ENCOUNTER (OUTPATIENT)
Facility: HOSPITAL | Age: 24
Setting detail: OUTPATIENT SURGERY
Discharge: HOME | End: 2024-07-15
Attending: STUDENT IN AN ORGANIZED HEALTH CARE EDUCATION/TRAINING PROGRAM | Admitting: STUDENT IN AN ORGANIZED HEALTH CARE EDUCATION/TRAINING PROGRAM
Payer: COMMERCIAL

## 2024-07-15 ENCOUNTER — ANESTHESIA (OUTPATIENT)
Dept: OPERATING ROOM | Facility: HOSPITAL | Age: 24
End: 2024-07-15
Payer: COMMERCIAL

## 2024-07-15 ENCOUNTER — ANESTHESIA EVENT (OUTPATIENT)
Dept: OPERATING ROOM | Facility: HOSPITAL | Age: 24
End: 2024-07-15
Payer: COMMERCIAL

## 2024-07-15 VITALS
DIASTOLIC BLOOD PRESSURE: 91 MMHG | HEART RATE: 72 BPM | OXYGEN SATURATION: 100 % | BODY MASS INDEX: 23.3 KG/M2 | TEMPERATURE: 97.7 F | WEIGHT: 187.39 LBS | SYSTOLIC BLOOD PRESSURE: 166 MMHG | RESPIRATION RATE: 21 BRPM | HEIGHT: 75 IN

## 2024-07-15 DIAGNOSIS — S92.352G CLOSED FRACTURE OF BASE OF FIFTH METATARSAL BONE OF LEFT FOOT WITH DELAYED HEALING: ICD-10-CM

## 2024-07-15 DIAGNOSIS — S92.352A CLOSED FRACTURE OF BASE OF FIFTH METATARSAL BONE OF LEFT FOOT, INITIAL ENCOUNTER: Primary | ICD-10-CM

## 2024-07-15 DIAGNOSIS — G89.18 POSTOPERATIVE PAIN: ICD-10-CM

## 2024-07-15 DIAGNOSIS — S92.353A CLOSED FRACTURE OF BASE OF FIFTH METATARSAL BONE: ICD-10-CM

## 2024-07-15 PROCEDURE — 2780000003 HC OR 278 NO HCPCS: Performed by: STUDENT IN AN ORGANIZED HEALTH CARE EDUCATION/TRAINING PROGRAM

## 2024-07-15 PROCEDURE — 2500000005 HC RX 250 GENERAL PHARMACY W/O HCPCS: Performed by: NURSE ANESTHETIST, CERTIFIED REGISTERED

## 2024-07-15 PROCEDURE — 20900 REMOVAL OF BONE FOR GRAFT: CPT | Performed by: STUDENT IN AN ORGANIZED HEALTH CARE EDUCATION/TRAINING PROGRAM

## 2024-07-15 PROCEDURE — A28485 PR OPEN TREATMENT METATARSAL FRACTURE EACH: Performed by: NURSE ANESTHETIST, CERTIFIED REGISTERED

## 2024-07-15 PROCEDURE — 3700000002 HC GENERAL ANESTHESIA TIME - EACH INCREMENTAL 1 MINUTE: Performed by: STUDENT IN AN ORGANIZED HEALTH CARE EDUCATION/TRAINING PROGRAM

## 2024-07-15 PROCEDURE — 3600000004 HC OR TIME - INITIAL BASE CHARGE - PROCEDURE LEVEL FOUR: Performed by: STUDENT IN AN ORGANIZED HEALTH CARE EDUCATION/TRAINING PROGRAM

## 2024-07-15 PROCEDURE — 7100000010 HC PHASE TWO TIME - EACH INCREMENTAL 1 MINUTE: Performed by: STUDENT IN AN ORGANIZED HEALTH CARE EDUCATION/TRAINING PROGRAM

## 2024-07-15 PROCEDURE — 64445 NJX AA&/STRD SCIATIC NRV IMG: CPT | Performed by: STUDENT IN AN ORGANIZED HEALTH CARE EDUCATION/TRAINING PROGRAM

## 2024-07-15 PROCEDURE — 2500000005 HC RX 250 GENERAL PHARMACY W/O HCPCS: Performed by: STUDENT IN AN ORGANIZED HEALTH CARE EDUCATION/TRAINING PROGRAM

## 2024-07-15 PROCEDURE — 2500000004 HC RX 250 GENERAL PHARMACY W/ HCPCS (ALT 636 FOR OP/ED): Performed by: STUDENT IN AN ORGANIZED HEALTH CARE EDUCATION/TRAINING PROGRAM

## 2024-07-15 PROCEDURE — 3700000001 HC GENERAL ANESTHESIA TIME - INITIAL BASE CHARGE: Performed by: STUDENT IN AN ORGANIZED HEALTH CARE EDUCATION/TRAINING PROGRAM

## 2024-07-15 PROCEDURE — 7100000002 HC RECOVERY ROOM TIME - EACH INCREMENTAL 1 MINUTE: Performed by: STUDENT IN AN ORGANIZED HEALTH CARE EDUCATION/TRAINING PROGRAM

## 2024-07-15 PROCEDURE — 3600000009 HC OR TIME - EACH INCREMENTAL 1 MINUTE - PROCEDURE LEVEL FOUR: Performed by: STUDENT IN AN ORGANIZED HEALTH CARE EDUCATION/TRAINING PROGRAM

## 2024-07-15 PROCEDURE — 7100000009 HC PHASE TWO TIME - INITIAL BASE CHARGE: Performed by: STUDENT IN AN ORGANIZED HEALTH CARE EDUCATION/TRAINING PROGRAM

## 2024-07-15 PROCEDURE — C1713 ANCHOR/SCREW BN/BN,TIS/BN: HCPCS | Performed by: STUDENT IN AN ORGANIZED HEALTH CARE EDUCATION/TRAINING PROGRAM

## 2024-07-15 PROCEDURE — 2500000004 HC RX 250 GENERAL PHARMACY W/ HCPCS (ALT 636 FOR OP/ED): Performed by: NURSE ANESTHETIST, CERTIFIED REGISTERED

## 2024-07-15 PROCEDURE — 28485 OPTX METATARSAL FX EACH: CPT | Performed by: STUDENT IN AN ORGANIZED HEALTH CARE EDUCATION/TRAINING PROGRAM

## 2024-07-15 PROCEDURE — 7100000001 HC RECOVERY ROOM TIME - INITIAL BASE CHARGE: Performed by: STUDENT IN AN ORGANIZED HEALTH CARE EDUCATION/TRAINING PROGRAM

## 2024-07-15 PROCEDURE — 76000 FLUOROSCOPY <1 HR PHYS/QHP: CPT | Mod: LT

## 2024-07-15 PROCEDURE — A28485 PR OPEN TREATMENT METATARSAL FRACTURE EACH: Performed by: STUDENT IN AN ORGANIZED HEALTH CARE EDUCATION/TRAINING PROGRAM

## 2024-07-15 PROCEDURE — 2720000007 HC OR 272 NO HCPCS: Performed by: STUDENT IN AN ORGANIZED HEALTH CARE EDUCATION/TRAINING PROGRAM

## 2024-07-15 DEVICE — SCREW, BONE, T8 FULL THREAD, 2.7 X 18MM: Type: IMPLANTABLE DEVICE | Site: FOOT | Status: FUNCTIONAL

## 2024-07-15 DEVICE — SCREW, LOCKING, T8 FULL THREAD, 2.7 X 14MM: Type: IMPLANTABLE DEVICE | Site: FOOT | Status: FUNCTIONAL

## 2024-07-15 DEVICE — IMPLANTABLE DEVICE: Type: IMPLANTABLE DEVICE | Site: FOOT | Status: FUNCTIONAL

## 2024-07-15 RX ORDER — ONDANSETRON HYDROCHLORIDE 2 MG/ML
4 INJECTION, SOLUTION INTRAVENOUS ONCE AS NEEDED
Status: DISCONTINUED | OUTPATIENT
Start: 2024-07-15 | End: 2024-07-15 | Stop reason: HOSPADM

## 2024-07-15 RX ORDER — LIDOCAINE HYDROCHLORIDE 20 MG/ML
INJECTION, SOLUTION INFILTRATION; PERINEURAL AS NEEDED
Status: DISCONTINUED | OUTPATIENT
Start: 2024-07-15 | End: 2024-07-15

## 2024-07-15 RX ORDER — OXYCODONE HYDROCHLORIDE 5 MG/1
5 TABLET ORAL EVERY 4 HOURS PRN
Status: DISCONTINUED | OUTPATIENT
Start: 2024-07-15 | End: 2024-07-15 | Stop reason: HOSPADM

## 2024-07-15 RX ORDER — CEFAZOLIN SODIUM 2 G/100ML
2 INJECTION, SOLUTION INTRAVENOUS ONCE
Status: DISCONTINUED | OUTPATIENT
Start: 2024-07-15 | End: 2024-07-15 | Stop reason: HOSPADM

## 2024-07-15 RX ORDER — SODIUM CHLORIDE, SODIUM LACTATE, POTASSIUM CHLORIDE, CALCIUM CHLORIDE 600; 310; 30; 20 MG/100ML; MG/100ML; MG/100ML; MG/100ML
100 INJECTION, SOLUTION INTRAVENOUS CONTINUOUS
Status: DISCONTINUED | OUTPATIENT
Start: 2024-07-15 | End: 2024-07-15 | Stop reason: HOSPADM

## 2024-07-15 RX ORDER — DIPHENHYDRAMINE HYDROCHLORIDE 50 MG/ML
12.5 INJECTION INTRAMUSCULAR; INTRAVENOUS ONCE AS NEEDED
Status: DISCONTINUED | OUTPATIENT
Start: 2024-07-15 | End: 2024-07-15 | Stop reason: HOSPADM

## 2024-07-15 RX ORDER — SODIUM CHLORIDE 0.9 G/100ML
IRRIGANT IRRIGATION AS NEEDED
Status: DISCONTINUED | OUTPATIENT
Start: 2024-07-15 | End: 2024-07-15 | Stop reason: HOSPADM

## 2024-07-15 RX ORDER — DEXMEDETOMIDINE IN 0.9 % NACL 20 MCG/5ML
SYRINGE (ML) INTRAVENOUS AS NEEDED
Status: DISCONTINUED | OUTPATIENT
Start: 2024-07-15 | End: 2024-07-15

## 2024-07-15 RX ORDER — SODIUM CHLORIDE, SODIUM LACTATE, POTASSIUM CHLORIDE, CALCIUM CHLORIDE 600; 310; 30; 20 MG/100ML; MG/100ML; MG/100ML; MG/100ML
100 INJECTION, SOLUTION INTRAVENOUS CONTINUOUS
Status: CANCELLED | OUTPATIENT
Start: 2024-07-15

## 2024-07-15 RX ORDER — PROPOFOL 10 MG/ML
INJECTION, EMULSION INTRAVENOUS AS NEEDED
Status: DISCONTINUED | OUTPATIENT
Start: 2024-07-15 | End: 2024-07-15

## 2024-07-15 RX ORDER — LIDOCAINE HYDROCHLORIDE 10 MG/ML
0.1 INJECTION, SOLUTION EPIDURAL; INFILTRATION; INTRACAUDAL; PERINEURAL ONCE
Status: DISCONTINUED | OUTPATIENT
Start: 2024-07-15 | End: 2024-07-15 | Stop reason: HOSPADM

## 2024-07-15 RX ORDER — ASPIRIN 81 MG/1
81 TABLET ORAL 2 TIMES DAILY
Qty: 84 TABLET | Refills: 0 | Status: SHIPPED | OUTPATIENT
Start: 2024-07-15 | End: 2024-08-26

## 2024-07-15 RX ORDER — FENTANYL CITRATE 50 UG/ML
INJECTION, SOLUTION INTRAMUSCULAR; INTRAVENOUS AS NEEDED
Status: DISCONTINUED | OUTPATIENT
Start: 2024-07-15 | End: 2024-07-15

## 2024-07-15 RX ORDER — OXYCODONE AND ACETAMINOPHEN 5; 325 MG/1; MG/1
1 TABLET ORAL EVERY 6 HOURS PRN
Qty: 28 TABLET | Refills: 0 | Status: SHIPPED | OUTPATIENT
Start: 2024-07-15

## 2024-07-15 RX ORDER — BUPIVACAINE HCL/EPINEPHRINE 0.5-1:200K
VIAL (ML) INJECTION AS NEEDED
Status: DISCONTINUED | OUTPATIENT
Start: 2024-07-15 | End: 2024-07-15

## 2024-07-15 RX ORDER — PNV NO.95/FERROUS FUM/FOLIC AC 28MG-0.8MG
1 TABLET ORAL 2 TIMES DAILY
Qty: 60 TABLET | Refills: 11 | Status: SHIPPED | OUTPATIENT
Start: 2024-07-15 | End: 2025-07-15

## 2024-07-15 RX ORDER — MIDAZOLAM HYDROCHLORIDE 1 MG/ML
INJECTION, SOLUTION INTRAMUSCULAR; INTRAVENOUS AS NEEDED
Status: DISCONTINUED | OUTPATIENT
Start: 2024-07-15 | End: 2024-07-15

## 2024-07-15 RX ORDER — CEFAZOLIN 1 G/1
INJECTION, POWDER, FOR SOLUTION INTRAVENOUS AS NEEDED
Status: DISCONTINUED | OUTPATIENT
Start: 2024-07-15 | End: 2024-07-15

## 2024-07-15 RX ORDER — KETOROLAC TROMETHAMINE 30 MG/ML
INJECTION, SOLUTION INTRAMUSCULAR; INTRAVENOUS AS NEEDED
Status: DISCONTINUED | OUTPATIENT
Start: 2024-07-15 | End: 2024-07-15

## 2024-07-15 RX ORDER — LABETALOL HYDROCHLORIDE 5 MG/ML
5 INJECTION, SOLUTION INTRAVENOUS ONCE AS NEEDED
Status: DISCONTINUED | OUTPATIENT
Start: 2024-07-15 | End: 2024-07-15 | Stop reason: HOSPADM

## 2024-07-15 ASSESSMENT — PAIN - FUNCTIONAL ASSESSMENT
PAIN_FUNCTIONAL_ASSESSMENT: 0-10
PAIN_FUNCTIONAL_ASSESSMENT: 0-10
PAIN_FUNCTIONAL_ASSESSMENT: UNABLE TO SELF-REPORT
PAIN_FUNCTIONAL_ASSESSMENT: UNABLE TO SELF-REPORT
PAIN_FUNCTIONAL_ASSESSMENT: 0-10
PAIN_FUNCTIONAL_ASSESSMENT: 0-10

## 2024-07-15 ASSESSMENT — COLUMBIA-SUICIDE SEVERITY RATING SCALE - C-SSRS
6. HAVE YOU EVER DONE ANYTHING, STARTED TO DO ANYTHING, OR PREPARED TO DO ANYTHING TO END YOUR LIFE?: NO
2. HAVE YOU ACTUALLY HAD ANY THOUGHTS OF KILLING YOURSELF?: NO
1. IN THE PAST MONTH, HAVE YOU WISHED YOU WERE DEAD OR WISHED YOU COULD GO TO SLEEP AND NOT WAKE UP?: NO

## 2024-07-15 ASSESSMENT — PAIN SCALES - GENERAL
PAINLEVEL_OUTOF10: 0 - NO PAIN

## 2024-07-15 NOTE — ANESTHESIA PROCEDURE NOTES
Peripheral Block    Patient location during procedure: pre-op  Start time: 7/15/2024 11:40 AM  End time: 7/15/2024 11:41 AM  Reason for block: at surgeon's request and post-op pain management  Staffing  Performed: attending   Authorized by: Adebayo Arias MD    Performed by: Adebayo Arias MD  Preanesthetic Checklist  Completed: patient identified, IV checked, site marked, risks and benefits discussed, surgical consent, monitors and equipment checked, pre-op evaluation and timeout performed   Timeout performed at:   Peripheral Block  Patient position: laying flat  Prep: ChloraPrep and site prepped and draped  Patient monitoring: continuous pulse ox, heart rate and cardiac monitor  Block type: popliteal  Laterality: left  Injection technique: single-shot  Guidance: ultrasound guided  Local infiltration: lidocaine  Infiltration strength: 1 %  Dose: 3 mL  Needle  Needle type: short-bevel   Needle gauge: 22 G  Needle length: 8 cm  Needle localization: ultrasound guidance  Test dose: negative  Assessment  Injection assessment: negative aspiration for heme, local visualized surrounding nerve on ultrasound, no paresthesia on injection and incremental injection  Heart rate change: no  Slow fractionated injection: yes

## 2024-07-15 NOTE — OP NOTE
Left Foot Fracture ORIF; Calcaneal Bone Graft [place] (L) Operative Note     Date: 7/15/2024  OR Location: [facility] A OR    Name: Darci Eastman, : 2000, Age: 24 y.o., MRN: [id number], Sex: male    Diagnosis  Pre-op Diagnosis      * Closed fracture of base of fifth metatarsal bone of left foot, initial encounter [S92.352A] Post-op Diagnosis     * Closed fracture of base of fifth metatarsal bone of left foot with delayed healing [S92.352G]     Procedures  Left Foot Fracture ORIF; Calcaneal Bone Graft [place]  [address] - [place] OPEN TREATMENT METATARSAL FRACTURE EACH    Left Foot Fracture ORIF; Calcaneal Bone Graft [place]  [address] - [place] BONE GRAFT ANY DONOR AREA MINOR/SMALL      Surgeons      * Jamshid Montgomery - Primary    Resident/Fellow/Other Assistant:  Surgeons and Role:  * No surgeons found with a matching role *    Procedure Summary  Anesthesia: General  ASA: II  Anesthesia Staff: Anesthesiologist: Adebayo Arias MD  CRNA: PINO Fay-CRNA  Estimated Blood Loss: < 5 mL  Intra-op Medications:   Administrations occurring from 1100 to 1430 on 07/15/24:   Medication Name Total Dose   sodium chloride 0.9 % irrigation solution 1,000 mL              Anesthesia Record               Intraprocedure I/O Totals       None           Specimen: No specimens collected     Staff:   Circulator: Marian Kasper Person: Hayde Santo Scrub: Jena  Circulator: Sera         Drains and/or Catheters: * None in log *    Tourniquet Times:     Total Tourniquet Time Documented:  Leg (Left) - 114 minutes  Total: Leg (Left) - 114 minutes      Implants:  Implants       Type Name Action Serial No.      Screw PLATE, NARROW LOCKING, 2.7/4 HOLES, L 32MM - SNA - [id number] Implanted NA     Screw SCREW, BONE, T8 FULL THREAD, 2.7 X 18MM - SNA - [id number] Implanted NA     Screw SCREW, LOCKING, T8 FULL THREAD, 2.7 X 14MM - SNA - [id number] Implanted NA            Patient Name: Darci Eastman  MRN: [id number]  Date of Birth:  2000  Date of Service: 07/15/24  Report Type: Operative    PREOPERATIVE DIAGNOSIS:    Left Fifth Metatarsal Stress Fracture    POSTOPERATIVE DIAGNOSIS:    Left Fifth Metatarsal Fracture Non-Union    OPERATION/PROCEDURE:    1) Left Fifth Metatarsal Non-Union Fracture Repair  2) Left Calcaneal Bone Graft    SURGEON:  Jamshid Montgomery MD    ASSISTANT(S):  Brayan Carlton MD (PGY-III)    ANESTHESIA:  General    ESTIMATED BLOOD LOSS: <5 cc    COMPLICATIONS: None apparent    DISPOSITION: PACU/Home    BRIEF CLINICAL HISTORY: 24-year-old male who presented as a referral for left foot pain.  Patient has had a longstanding history of pain dating back to October 2023.  This was recently exacerbated after a fall while playing ultimate Frisbee from 06/14/2024.  By report, the patient thinks the pain started when he initiated jumping.  He has been having difficulty weightbearing but has been nonweightbearing in his left lower extremity since his injury.  On my evaluation of the patient.  He had pain that localized to the lateral aspect of the fifth metatarsal.  There was obvious bossing evident and tenderness to palpation.  Both review of his x-ray imaging as well as MRI demonstrated a complete but nondisplaced fracture of the fifth metatarsal, likely secondary to a new injury following a previous injury.  I had a long discussion with the patient regarding his treatment options.  From a nonsurgical treatment standpoint, he could certainly consider a period of immobilization with or without a bone stimulator.  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  I reviewed the risks, benefits and alternatives to surgery, risks include but were not limited to infection, wound healing complications, need for further surgery,  persistent or worsening pain, postoperative stiffness, bleeding, blood loss requiring blood transfusion, neurovascular injury, Mal or nonunion, recurrent deformity, hardware pain, hardware failure, failure the procedure, complications of anesthesia, stroke, DVT/PE, myocardial infarction and death.  Benefits included decreased pain and improved function as well as fracture healing.  Alternatives included continued conservative management, with or without a bone stimulator.  The patient voiced understanding the risk, benefits and alternatives and elected to proceed with surgery.  The informed consent was signed with both myself and the patient present.      OPERATIVE REPORT: On the day of surgery 07/15/2024, the patient was met in the preoperative holding area by members of the orthopedic, anesthesia and nursing teams.  I marked the patient's left lower extremity with indelible ink with the patient as my witness.  The informed consent was again reviewed.  All remaining questions were answered.  In the preoperative holding area, the anesthesia team performed a regional block. The patient had been previously medically optimized for surgery by ROMAIN.    The patient was then brought back to the operating room on [facility].  I led a preoperative timeout, verifying the correct patient, procedure and laterality of procedure to be performed.  We confirmed the appropriate availability of all surgical equipment,  implants, utilization of fluoroscopy and confirmed the administration of pre-surgical IV antibiotic prophylaxis within 1 hour of incision time, 2 g Ancef.  All present were in agreement.    Care was handed over to the anesthesia team who provided GETA.  The patient was then transferred onto the operating room table in the supine position.  All bony prominences were padded and an SCD was placed on the contra-lateral extremity. A nonsterile, well-padded thigh tourniquet was placed.  The patient's left lower extremity  was then prepped and draped in usual sterile fashion with sterile prep with ChloraPrep.    I let a preprocedure pause again verifying the correct patient, procedure and laterality of procedure to be performed.  All present were in agreement.  The extremity was exsanguinated with the use of an Esmarch and the tourniquet was inflated to 275 mmHg.  Beginning with the calcaneal bone graft harvest, I marked out an oblique incision parallel to but well plantar to the course of both the peroneal tendons and sural nerve.  I incised through skin and subcutaneous tissue and spread bluntly to bone.  The Arthrex 6 mm autograft harvester was then introduced into the calcaneus into cortically.  The plug was removed and a second pass was made.  Additional graft was procured with use of a rongeur.  Meticulous hemostasis was achieved and the incision was closed with 3-0 nylon in a simple interrupted fashion.    I marked a standard plantar lateral approach to the fifth metatarsal at the border of the glabrous and on glabrous skin.  I incised through skin and subcutaneous tissue.  Scissor dissection proceeded through the subcutaneous tissue in order to look out for the sural nerve which was not identified in the plane of dissection.  The abductor digiti minimi was bluntly dissected plantarly.  The fifth metatarsal fracture was then identified readily on the plantar aspect and confirmed by biplanar fluoroscopic imaging.  I performed minimal subperiosteal elevation to improve fracture visualization.  The fracture edges were freshened with the use of scalpel, curette and rongeur.  The sclerotic margins were then drilled with a K wire to encourage marrow egress.  The calcaneal autograft bone was then delivered to the residual voids of the plantar and lateral margins of the fracture and impacted into place with the use of a tamp.  Adequacy of bone grafting was confirmed by biplanar fluoroscopic imaging.    I then selected an appropriately  sized 4-hole [place] VariAx two 2.7 mm mini fragment plate.  This was anatomically under contoured to achieve compression at the fracture site.  The plate was then provisionally applied to the plantar lateral surface of the fifth metatarsal with K wires placed through joystick towers.  Plate position was confirmed on AP, oblique and lateral foot films.  Beginning with the second most proximal screw hole as an axilla under fluoroscopic guidance, I drilled, measured and secured a 2.7 mm fully threaded bicortical screw with excellent fixation to provisionally deliver the plate to bone. Then utilizing the oblong hole drilled eccentrically in order to achieve additional compression, again under fluoroscopic guidance, I drilled, measured and secured a fully threaded bicortical screw with excellent fixation.  Fixation was then completed proximally with a unicortical locking screw so as to avoid intra-articular penetration of the 5th TMTJ.  Finally fixation was completed distally with a bicortical locking screw.  Remaining bone graft was impacted at the fracture site.    Final fluoroscopic films including AP, lateral and oblique foot views were obtained demonstrating appropriate position, alignment and compression noted at the fracture site with appropriate screw length and trajectory without intra-articular penetration.    The wounds were copiously irrigated and closed in layers.  2-0 Vicryl was used for the deep layer with 3-0 Vicryl for the deep dermal layer and 3-0 nylon for skin.  The skin was cleansed and dried and dry sterile dressings were placed.  The sterile drapes were removed.  The patient was then placed into a well-padded short leg splint.  By this time, the tourniquet had been released and there was excellent reperfusion of the extremity with palpable 2+ DP/PT pulses.    All surgical counts were noted to be correct. The patient was then awoken from anesthesia without complication and transferred from the  operating room table onto the [facility] and then brought back to the PACU in stable condition.    Post-Operative Plan:   The patient will remain nonweightbearing in their left lower extremity in a short leg plaster splint aspirin.  They will begin  for DVT prophylaxis.  I have encouraged early mobilization and extremity elevation as tolerated.  I will plan to see the patient back in approximately 2 weeks for their first postoperative visit.    Complications: None apparent  Disposition: PACU - hemodynamically stable.  Condition: stable     Attending Attestation: I was present and scrubbed for the entire procedure.    Jamshid Montgomery MD, LAURA  Department of Orthopaedic Surgery  [facility]    Note dictated with NoteWagon software.  Completed without full type editing and sent to avoid delay.

## 2024-07-15 NOTE — ANESTHESIA PREPROCEDURE EVALUATION
Patient: Darci Eastman    Procedure Information       Date/Time: 07/15/24 1100    Procedure: Left Foot Fracture ORIF; Calcaneal Bone Graft [place] (Left: Foot)    Location: [facility] A OR 17 / Virtual [facility] A OR    Surgeons: Jamshid Montgomery MD            Relevant Problems   No relevant active problems       Clinical information reviewed:   Tobacco  Allergies  Meds   Med Hx  Surg Hx   Fam Hx  Soc Hx        NPO Detail:  NPO/Void Status  Carbohydrate Drink Given Prior to Surgery? : N  Date of Last Liquid: 07/15/24  Time of Last Liquid: 0000  Date of Last Solid: 07/14/24  Time of Last Solid: 2000  Last Intake Type: Clear fluids  Time of Last Void: 0900         Physical Exam    Airway  Mallampati: II  TM distance: >3 FB  Neck ROM: full     Cardiovascular   Rhythm: regular  Rate: normal     Dental    Pulmonary   Breath sounds clear to auscultation     Abdominal            Anesthesia Plan    History of general anesthesia?: yes  History of complications of general anesthesia?: no    ASA 2     general     intravenous induction   Anesthetic plan and risks discussed with patient.    Plan discussed with CRNA.

## 2024-07-15 NOTE — BRIEF OP NOTE
Date: 7/15/2024  OR Location: [facility] OR    Name: Darci Eastman, : 2000, Age: 24 y.o., MRN: [id number], Sex: male    Diagnosis  Pre-op Diagnosis      * Closed fracture of base of fifth metatarsal bone of left foot, initial encounter [S92.352A] Post-op Diagnosis     * Closed fracture of base of fifth metatarsal bone of left foot with delayed healing [S92.352G]     Procedures  Left Foot Fracture ORIF; Calcaneal Bone Graft [place]  [address] - [place] OPEN TREATMENT METATARSAL FRACTURE EACH    Left Foot Fracture ORIF; Calcaneal Bone Graft [place]  [address] - [place] BONE GRAFT ANY DONOR AREA MINOR/SMALL      Surgeons      * Jamshid Montgomery - Primary    Resident/Fellow/Other Assistant:  Surgeons and Role:  * No surgeons found with a matching role *    Procedure Summary  Anesthesia: General  ASA: II  Anesthesia Staff: Anesthesiologist: Adebayo Arias MD  CRNA: PINO Fay-CRNA  Estimated Blood Loss: <5mL  Intra-op Medications:   Administrations occurring from 1100 to 1430 on 07/15/24:   Medication Name Total Dose   sodium chloride 0.9 % irrigation solution 1,000 mL              Anesthesia Record               Intraprocedure I/O Totals       None           Specimen: No specimens collected     Staff:   Circulator: Marian Kasper Person: Hayde Santo Scrub: Jnea  Circulator: Sera          Findings: see operative report    Complications:  None; patient tolerated the procedure well.     Disposition: PACU - hemodynamically stable.  Condition: stable  Specimens Collected: No specimens collected    See operative report from same day.    Jamshid Montgomery MD, LAURA  Department of Orthopaedic Surgery  [facility]

## 2024-07-15 NOTE — ANESTHESIA PROCEDURE NOTES
Airway  Date/Time: 7/15/2024 12:06 PM  Urgency: elective    Airway not difficult    Staffing  Performed: CRNA   Authorized by: Adebayo Arias MD    Performed by: PINO Fay-RENEE  Patient location during procedure: OR    Indications and Patient Condition  Indications for airway management: anesthesia  Spontaneous Ventilation: absent  Sedation level: deep  Preoxygenated: yes  Patient position: sniffing  MILS maintained throughout  Mask difficulty assessment: 0 - not attempted    Final Airway Details  Final airway type: supraglottic airway      Successful airway: Size 4     Number of attempts at approach: 1    Additional Comments  I gel

## 2024-07-15 NOTE — DISCHARGE INSTRUCTIONS
"POST-OPERATIVE DISCHARGE INSTRUCTIONS:    ACTIVITY:    Non-Weightbearing LLE in a Short Leg Plaster Splint     You are advised to go home directly from the hospital or surgical center. Restrict your activities.    Return to school/work will be determined at next clinic visit, unless previously discussed with the surgical team     BLOOD CLOT PREVENTION:    To prevent blood clot formation, you have been prescribed ASA to be taken as prescribed until your surgeon or his Physician's Assistant (PA) states you can stop taking it.    Moving around and walking (with crutches) helps decrease the risk of blood clots. You must get up and \"move around\" once every hour, unless your are sleeping.    While you are sleeping and when you are not being active, continue to keep your leg elevated as much as possible.    It is common to have swelling in your feet after surgery for even a year afterwards, so the more you keep the leg elevated after surgery, the less swelling you will have later on.     GENERAL INSTRUCTIONS:  1.  Keep your surgical site elevated above your heart for at least 7 days or longer to prevent swelling (a good way to remember this is \"toes above nose\"). This will improve your comfort and your overall recovery following surgery.  Avoid pressure under the heel and keep the heel clear to avoid ulceration     2. Be alert for signs of infection including redness, streaking, odor, fever or chills. Be alert for excessive pain or bleeding and notify your surgeon immediately. Also, notify your surgeon of nausea, vomiting, or chills, numbness or weakness, bleeding, redness, swelling, pain, or drainage from surgical incision(s), bowel or bladder dysfunction, severe pain not relieved by pain medications, temperature greater than 101.0 F (38.3 C) degrees.    3. If you smoke (or have smoked within the last year), we strongly recommend that you do not smoke. This may lead to increased risk of wound infection and will decrease " your chances of healing (bone, soft tissue, etc).     WOUND INSTRUCTIONS:    Leave your Short Leg Plaster Splint until your post operative visit.  Keep it clean and dry.     Always keep the incision clean and dry until the staples/sutures are removed. If there is no drainage from the incision you should keep it open to air. If there is drainage from the incision you must keep it covered at all times until the drainage stops.    You may shower carefully (avoid falling) but the incision must be covered with Tegaderm or a water proof dressing so the wound does not get wet; once the staples/sutures are removed, the Tegaderm is no longer necessary and the wound may be washed with soap and water.     If you do not have sutures that need to be removed  then the incision should remain clean and dry for a minimum of 14 days.    Do not soak in a bath tub, hot tub, pool, lake or other body of water until atleast 21 days after your surgery and your incision is completely dry and healed. Or until approved by Dr. Montgomery.    If you have removable sutures (or staples) they will be removed in approximately 14-21 days (unless otherwise instructed) from the day of your surgery.     If you have a fiberglass cast or splint in place, please consider the following instructions:  1)  Elevate the extremity as much as possible.  2)  Keep the cast or splint clean and dry.  3)  Please call us if the cast becomes wet or dirty.  4)  If you are experiencing worsening pain or worsening swelling, please call.  5)  Itching can be bothersome.  You can try:  - Scratching the opposite limb in the same spot.  - Running air through the cast or splint with a blow dryer on cold.  - Do NOT stick anything inside the cast as it may become lodged    DIET:    Return to your regular diet as tolerated. Begin with light or bland foods. Drink plenty of fluids.      MEDICATIONS:    Resume all previous home medications at the previous prescribed dose and frequency  "unless otherwise noted    PAIN CONTROL:     For pain control, you have been prescribed medications.    Narcotic medication in the form of percocet. Take as prescribed and wean as able. Do not take with medications that are sedating, especially benzodiazepines.  - Take as prescribed for the first three days then stop when prescriptions done. This will provide a strong anti-inflammatory effect for the first few days.  2. The anesthesia block will wear off eventually. Some patients it will last only a few hours after surgery and in others it may last a few days. As soon as you start feeling any sensation at all in your leg, start taking the medications, so that you're not caught playing \"catch up\".   3. Remember to keep elevated. Avoid using ice while your extremity nerve block is still working. If you can't feel your leg and it is still numb, then the ice may induce injury. Ice behind the knee can help as well. You may apply ice to your cast/splint/dressing for the first few days. 20 minutes of icing followed by 2 hours of no ice. Keep the splint/cast/dressing dry and place a towel between the ice and the dressing. DO NOT GET YOUR DRESSING/SPLINT/CAST WET!  4. While taking narcotics, you may have constipation.  A stool softener is recommended. You may use an over-the-counter stool softener or use the one that has been prescribed for you. Colace and Senokot are common over-the-counter medications.  6. Do not drink alcohol or drive while using narcotic pain medication.  7. Do NOT take additional tylenol if your pain medication already has tylenol in it     IMPORTANT INFORMATION:    Please call your surgeon' s clinic with questions Monday-Friday during business hours. If no one answers, please leave a message and someone should get back to the patient within 24 hours.  For after-hours calls, please call the  and request to contact the answering service for Dr. Montgomery.   For emergencies, call 911 or present to the " nearest ER for immediate evaluation.     FOLLOWUP INSTRUCTIONS:    Please contact [phone] to confirm scheduled follow-up appointment or to make changes to your scheduled appointment.

## 2024-07-16 NOTE — ANESTHESIA POSTPROCEDURE EVALUATION
Patient: Darci Eastman    Procedure Summary       Date: 07/15/24 Room / Location: [facility] OR 17 / Virtual [facility] OR    Anesthesia Start: 1158 Anesthesia Stop: 1446    Procedure: Left Foot Fracture ORIF; Calcaneal Bone Graft [place] (Left: Foot) Diagnosis:       Closed fracture of base of fifth metatarsal bone of left foot with delayed healing      (Closed fracture of base of fifth metatarsal bone of left foot, initial encounter [S92.352A])    Surgeons: Jamshid Montgomery MD Responsible Provider: Adebayo Arias MD    Anesthesia Type: general ASA Status: 2            Anesthesia Type: general    Vitals Value Taken Time   /75 07/15/24 1531   Temp 36.5 °C (97.7 °F) 07/15/24 1445   Pulse 73 07/15/24 1543   Resp 16 07/15/24 1543   SpO2 100 % 07/15/24 1543   Vitals shown include unfiled device data.    Anesthesia Post Evaluation    Patient location during evaluation: bedside  Patient participation: complete - patient participated  Level of consciousness: awake  Pain management: adequate  Multimodal analgesia pain management approach  Airway patency: patent  Cardiovascular status: stable  Respiratory status: spontaneous ventilation and unassisted  Hydration status: acceptable  Postoperative Nausea and Vomiting: none  Comments: No significant PONV.        No notable events documented.

## 2024-07-31 ENCOUNTER — OFFICE VISIT (OUTPATIENT)
Dept: ORTHOPEDIC SURGERY | Facility: CLINIC | Age: 24
End: 2024-07-31
Payer: COMMERCIAL

## 2024-07-31 DIAGNOSIS — S92.352A CLOSED FRACTURE OF BASE OF FIFTH METATARSAL BONE OF LEFT FOOT, INITIAL ENCOUNTER: Primary | ICD-10-CM

## 2024-07-31 PROCEDURE — 99211 OFF/OP EST MAY X REQ PHY/QHP: CPT | Performed by: STUDENT IN AN ORGANIZED HEALTH CARE EDUCATION/TRAINING PROGRAM

## 2024-08-01 NOTE — PROGRESS NOTES
ORTHOPAEDIC SURGERY PROGRESS NOTE    Chief Complaint:  Left foot pain    History Of Present Illness  Darci Eastman is a 24 y.o. male who presents for evaluation of left foot pain as a referral from Dr. Chan.  Patient initially sustained an injury to his left foot and October 2023.  Portions of the history are provided both by the patient as well as EMR and review of care everywhere.  This was treated conservatively.  Patient was later seen in January 2024 with mild persistent pain.  He was then seen on 06/17/2024 for acute on chronic left foot pain.  He sustained a awkward landing while playing ultimate Frisbee from 06/14/2024.  He actually thinks he was going up for the [employer] when he noticed pain.  He was seen in an urgent care setting time and made nonweightbearing in his left lower extremity in a fracture boot.    Patient was intermittently treated for presumed peroneal tendinitis after attending a walk-in therapy student clinic at [employer].  He has been able to continue playing but has noticed a nagging pain.  He has been able to play through the pain.    He currently reports minimal with pain nonweightbearing.  He has been compliant with nonweightbearing restrictions in a tall walking boot.  He has had a recent MRI and is here for review.    Patient does not use tobacco products.  He has recently graduated and is on the job hunt.    07/31/2024: Patient returns s/p ORIF left fifth metatarsal fracture with calcaneus bone graft from 07/15/2024.  Patient has been compliant with nonweightbearing restrictions utilizing a knee scooter.  He has been on aspirin for DVT prophylaxis.  He has had noted improvement in his right foot pain from prior to surgery.  He has been mobilizing his right foot.  He is not reporting any numbness in his left foot.  He is maintained on aspirin for DVT prophylaxis and VitD/Ca.     Past Medical History  Past Medical History:   Diagnosis Date    Closed fracture of base of fifth  metatarsal bone of left foot, initial encounter        Surgical History  Recent Surgeries in Orthopaedic Surgery            No cases to display             Social History  Social History     Socioeconomic History    Marital status: Single   Tobacco Use    Smoking status: Never    Smokeless tobacco: Never   Vaping Use    Vaping status: Never Used   Substance and Sexual Activity    Alcohol use: Yes     Alcohol/week: 6.0 standard drinks of alcohol     Types: 6 Standard drinks or equivalent per week    Drug use: Yes     Types: Marijuana     Comment: last use April - no other drugs    Sexual activity: Defer       Family History  Family History   Problem Relation Name Age of Onset    ALS Mother      Hypertension Father      No Known Problems Sister      No Known Problems Brother          Allergies  No Known Allergies    Review of Systems  REVIEW OF SYSTEMS  Constitutional: no unplanned weight loss  Psychiatric: no suicidal ideation  ENT: no vision changes, no sinus problems  Pulmonary: no shortness of breath  Lymphatic: no enlarged lymph nodes  Cardiovascular: no chest pain or shortness of breath  Gastrointestinal: no stomach problems  Genitourinary: no dysuria   Skin: no rashes  Endocrine: no thyroid problems  Neurological: no headache, no numbness  Hematological: no easy bruising  Musculoskeletal: Left foot pain    Physical Exam  PHYSICAL EXAMINATION  Constitutional Exam: well developed and well nourished  Psychiatric Exam: alert and oriented, appropriate mood and behavior  Eye Exam: EOMI  Pulmonary Exam: breathing non-labored, no apparent distress  Lymphatic exam: no appreciable lymphadenopathy in the lower extremities  Cardiovascular exam: RRR to peripheral palpation, DP pulses 2+, PT 2+, toes are pink with good capillary refill, no pitting edema  Skin exam: no open lesions, rashes, abrasions or ulcerations  Neurological exam: sensation to light touch intact in both lower extremities in peripheral and dermatomal  distributions (except for any abnormalities noted in musculoskeletal exam)    Musculoskeletal exam: Left lower extremity examination.  Patient calcaneal bone graft and lateral fifth metatarsal incisions with skin edges and suture line well-approximated.  There is no margarita-incisional erythema, induration, fluctuance or drainage.  Patient is minimally tender to palpation at both of the sites. Patient has pain-free and supple ankle, subtalar midtarsal joint range of motion.  Patient has sensation intact to light touch grossly in a saphenous, sural, superficial peroneal, deep peroneal and tibial nerve distribution. He has intact PF/DF/EHL. 2+ Dp/pt pulses.    Last Recorded Vitals  There were no vitals taken for this visit.    Laboratory Results    No results found for this or any previous visit (from the past 24 hour(s)).    Radiology Results   No new imaging this visit.    Assessment/Plan:  24-year-old male s/p ORIF left fifth metatarsal fracture with calcaneus bone graft from 07/15/2024 Who presents for wound check with clinically well-appearing wounds.  I recommend the patient continue nonweightbearing his left lower extremity and transition into a walking boot which she already possesses.  I will retain his sutures today and have asked the patient to keep his wound covered so as to limit irritation with boot wear.  He will continue on aspirin for DVT prophylaxis and Ca/VitD.  I will plan to see the patient back in approximately 2 weeks to follow his clinical course, he may begin gentle ROM of the left foot.  I anticipate initiating physical therapy and progressing weightbearing following clinical and radiographic review.  Upon return patient would require three-view nonweightbearing left foot and three-view weightbearing right foot.    Jamshid Montgomery MD, LAURA  Department of Orthopaedic Surgery  [facility]    The diagnosis and treatment plan were reviewed with the patient. All  questions were answered. The patient verbalized understanding of the treatment plan. There were no barriers to understanding identified.    Note dictated with [employer] software.  Completed without full type editing and sent to avoid delay.

## 2024-08-13 ENCOUNTER — OFFICE VISIT (OUTPATIENT)
Dept: ORTHOPEDIC SURGERY | Facility: CLINIC | Age: 24
End: 2024-08-13
Payer: COMMERCIAL

## 2024-08-13 ENCOUNTER — HOSPITAL ENCOUNTER (OUTPATIENT)
Dept: RADIOLOGY | Facility: CLINIC | Age: 24
Discharge: HOME | End: 2024-08-13
Payer: COMMERCIAL

## 2024-08-13 DIAGNOSIS — S92.352A CLOSED FRACTURE OF BASE OF FIFTH METATARSAL BONE OF LEFT FOOT, INITIAL ENCOUNTER: ICD-10-CM

## 2024-08-13 PROCEDURE — 73630 X-RAY EXAM OF FOOT: CPT | Mod: 50

## 2024-08-13 PROCEDURE — 99211 OFF/OP EST MAY X REQ PHY/QHP: CPT | Performed by: STUDENT IN AN ORGANIZED HEALTH CARE EDUCATION/TRAINING PROGRAM

## 2024-08-13 ASSESSMENT — PAIN - FUNCTIONAL ASSESSMENT: PAIN_FUNCTIONAL_ASSESSMENT: 0-10

## 2024-08-13 ASSESSMENT — PAIN SCALES - GENERAL: PAINLEVEL_OUTOF10: 3

## 2024-08-13 ASSESSMENT — PAIN DESCRIPTION - DESCRIPTORS: DESCRIPTORS: ACHING;DULL;DISCOMFORT

## 2024-08-13 NOTE — PROGRESS NOTES
ORTHOPAEDIC SURGERY PROGRESS NOTE    Chief Complaint:  Left foot pain    History Of Present Illness  Darci Eastman is a 24 y.o. male who presents for evaluation of left foot pain as a referral from Dr. Chan.  Patient initially sustained an injury to his left foot and October 2023.  Portions of the history are provided both by the patient as well as EMR and review of care everywhere.  This was treated conservatively.  Patient was later seen in January 2024 with mild persistent pain.  He was then seen on 06/17/2024 for acute on chronic left foot pain.  He sustained a awkward landing while playing ultimate Frisbee from 06/14/2024.  He actually thinks he was going up for the [place] when he noticed pain.  He was seen in an urgent care setting time and made nonweightbearing in his left lower extremity in a fracture boot.    Patient was intermittently treated for presumed peroneal tendinitis after attending a walk-in therapy student clinic at [employer].  He has been able to continue playing but has noticed a nagging pain.  He has been able to play through the pain.    He currently reports minimal with pain nonweightbearing.  He has been compliant with nonweightbearing restrictions in a tall walking boot.  He has had a recent MRI and is here for review.    Patient does not use tobacco products.  He has recently graduated and is on the job hunt.    07/31/2024: Patient returns s/p ORIF left fifth metatarsal fracture with calcaneus bone graft from 07/15/2024.  Patient has been compliant with nonweightbearing restrictions utilizing a knee scooter.  He has been on aspirin for DVT prophylaxis.  He has had noted improvement in his right foot pain from prior to surgery.  He has been mobilizing his right foot.  He is not reporting any numbness in his left foot.  He is maintained on aspirin for DVT prophylaxis and VitD/Ca.    08/13/2024: Patient returns s/p ORIF left fifth metatarsal fracture with calcaneus bone graft from  07/15/2024.  Patient is reporting 3 out of 10 pain in the left foot, that is gradually improving.  He has been compliant with nonweightbearing restrictions utilizing a knee scooter.  He has been on aspirin for DVT prophylaxis.  He notes continued improvement with respect to his right foot pain.  He denies numbness, tingling or weakness in the LLE.     Past Medical History  Past Medical History:   Diagnosis Date    Closed fracture of base of fifth metatarsal bone of left foot, initial encounter        Surgical History  Recent Surgeries in Orthopaedic Surgery            No cases to display             Social History  Social History     Socioeconomic History    Marital status: Single   Tobacco Use    Smoking status: Never    Smokeless tobacco: Never   Vaping Use    Vaping status: Never Used   Substance and Sexual Activity    Alcohol use: Yes     Alcohol/week: 6.0 standard drinks of alcohol     Types: 6 Standard drinks or equivalent per week    Drug use: Yes     Types: Marijuana     Comment: last use April - no other drugs    Sexual activity: Defer       Family History  Family History   Problem Relation Name Age of Onset    ALS Mother      Hypertension Father      No Known Problems Sister      No Known Problems Brother          Allergies  No Known Allergies    Review of Systems  REVIEW OF SYSTEMS  Constitutional: no unplanned weight loss  Psychiatric: no suicidal ideation  ENT: no vision changes, no sinus problems  Pulmonary: no shortness of breath  Lymphatic: no enlarged lymph nodes  Cardiovascular: no chest pain or shortness of breath  Gastrointestinal: no stomach problems  Genitourinary: no dysuria   Skin: no rashes  Endocrine: no thyroid problems  Neurological: no headache, no numbness  Hematological: no easy bruising  Musculoskeletal: Left foot pain    Physical Exam  PHYSICAL EXAMINATION  Constitutional Exam: well developed and well nourished  Psychiatric Exam: alert and oriented, appropriate mood and behavior  Eye  Exam: EOMI  Pulmonary Exam: breathing non-labored, no apparent distress  Lymphatic exam: no appreciable lymphadenopathy in the lower extremities  Cardiovascular exam: RRR to peripheral palpation, DP pulses 2+, PT 2+, toes are pink with good capillary refill, no pitting edema  Skin exam: no open lesions, rashes, abrasions or ulcerations  Neurological exam: sensation to light touch intact in both lower extremities in peripheral and dermatomal distributions (except for any abnormalities noted in musculoskeletal exam)    Musculoskeletal exam: Left lower extremity examination.  Patient calcaneal and lateral fifth metatarsal incisions well-healed.  There is no margarita-incisional erythema, induration, fluctuance or drainage.  Patient is nontender to palpation overlying the fifth metatarsal. Patient has pain-free and supple ankle, subtalar midtarsal joint range of motion.  Patient has sensation intact to light touch grossly in a saphenous, sural, superficial peroneal, deep peroneal and tibial nerve distribution. He has intact PF/DF/EHL. 2+ Dp/pt pulses.    Last Recorded Vitals  There were no vitals taken for this visit.    Laboratory Results    No results found for this or any previous visit (from the past 24 hour(s)).    Radiology Results   X-ray imaging 3 view nonweightbearing bilateral feet reviewed from 08/13/2024 and independently evaluated by me demonstrates no acute fracture or dislocation, with particular reference to the right side.  There is appropriate position and alignment following plantar plating of the fifth metatarsal on the left with appropriate screw length and trajectory, there is interval fracture consolidation.    Assessment/Plan:  24-year-old male s/p ORIF left fifth metatarsal fracture with calcaneus bone graft from 07/15/2024 who presents with clinical and radiographic evidence of healing.  I will recommend the patient begin progressive weightbearing on his left lower extremity utilizing crutches and  primarily heel weightbearing for the next 2 weeks.  He may then steadily wean from his crutches over the following 2 weeks.  I will provide him with a formal referral to physical therapy and remove his sutures today.  I will plan to see the patient back in approximately 1 month for repeat clinical and radiographic evaluation.  I encouraged the patient to contact the office if he develops any new pain, worsening symptoms or if he has any further questions.  Upon return, patient would require three-view weightbearing left foot.    Jamshid Montgomery MD, LAURA  Department of Orthopaedic Surgery  [facility]    The diagnosis and treatment plan were reviewed with the patient. All questions were answered. The patient verbalized understanding of the treatment plan. There were no barriers to understanding identified.    Note dictated with [employer] software.  Completed without full type editing and sent to avoid delay.

## 2024-08-29 ENCOUNTER — EVALUATION (OUTPATIENT)
Dept: PHYSICAL THERAPY | Facility: HOSPITAL | Age: 24
End: 2024-08-29
Payer: COMMERCIAL

## 2024-08-29 DIAGNOSIS — R26.2 DIFFICULTY WALKING: ICD-10-CM

## 2024-08-29 DIAGNOSIS — S92.352D DISPLACED FRACTURE OF FIFTH METATARSAL BONE OF LEFT FOOT WITH ROUTINE HEALING: ICD-10-CM

## 2024-08-29 DIAGNOSIS — R29.898 LEFT LEG WEAKNESS: ICD-10-CM

## 2024-08-29 DIAGNOSIS — M25.672 ANKLE STIFFNESS, LEFT: ICD-10-CM

## 2024-08-29 DIAGNOSIS — Z47.89 ORTHOPEDIC AFTERCARE: Primary | ICD-10-CM

## 2024-08-29 PROCEDURE — 97110 THERAPEUTIC EXERCISES: CPT | Mod: GP | Performed by: PHYSICAL THERAPIST

## 2024-08-29 PROCEDURE — 97161 PT EVAL LOW COMPLEX 20 MIN: CPT | Mod: GP | Performed by: PHYSICAL THERAPIST

## 2024-08-29 NOTE — PROGRESS NOTES
Physical Therapy  Physical Therapy Orthopedic Evaluation    Patient Name: Darci Eastman  MRN: [id number]  Today's Date: 8/29/2024  Time Calculation  Start Time: 0705  Stop Time: 0800  Time Calculation (min): 55 min    Insurance:  Visit number: 1 of MN  Authorization info: no auth  Insurance Type: [facility]    General:  General  Reason for Referral: s/p ORIF left fifth metatarsal fracture with calcaneus bone graft  Referred By: Dr. Jamshid Montgomery MD    Current Problem  1. Orthopedic aftercare        2. Displaced fracture of fifth metatarsal bone of left foot with routine healing  Referral to Physical Therapy      3. Ankle stiffness, left        4. Difficulty walking        5. Left leg weakness            Precautions: Precautions  STEADI Fall Risk Score (The score of 4 or more indicates an increased risk of falling): 0  LE Weight Bearing Status: Weight Bearing as Tolerated (in CAM boot)  Pain:       Medical History Form: Reviewed (scanned into chart)    Subjective:   Pt is a 24 y.o. y.o male presenting to the clinic with s/p ORIF left fifth metatarsal fracture with calcaneus bone graft on 7/15/2024. Patient states symptoms began 10/2023. DIEGO: scooter injury/ultimate Frisbee 4/1/2024 and fx in June 2024. Pain Intensity:  0/10 at rest, (10 = worst imaginable pain), 1-2/10 at worst.  Pt. describes the pain as ache, & reports symptoms are  Improving since onset.    Reports symptoms are aggravated with walking.  States symptoms are better with rest.    Pt. No changes in bowel/bladder, No saddle anesthesia, & No  pain at night.    Pt. No  diplopia, dizziness, drop attacks, dysphagia, or dysarthria.    PMH: unremarkable    Occupation:     Pt. Goals: to return to ultimate Frisbee     Patients Living Environment: Reviewed and no concern    Primary Language: English    There are no spiritual/cultural practices/values/needs that are important to know      Red Flags: Do you have any of the following?  "No  Fever/chills, unexplained weight changes, dizziness/fainting, unexplained change in bowel or bladder functions, unexplained malaise or muscle weakness, night pain/sweats, numbness or tingling    Objective:  Objective   HIP    Functional Rating Scale  LEFS   /80: 49/80    Hip AROM  Hip AROM WFL: yes    KNEE  Knee AROM  Knee AROM WFL: yes    ANKLE    Ankle AROM  R ankle dorsiflexion: (10°): 15  L ankle dorsiflexion: (10°): 12*  R ankle plantarflexion: (40°): 40  L ankle plantarflexion: (40°): 40  R ankle inversion: (30°): 30  L ankle inversion: (30°): 30  R ankle eversion: (20°): 20  L ankle eversion: (20°): 18  Ankle PROM  R ankle dorsiflexion: (10°): 15  L ankle dorsiflexion: (10°): 15  R ankle plantarflexion: (40°): 40  L ankle plantarflexion: (40°): 40  R ankle inversion: (30°): 30  L ankle inversion: (30°): 30  R ankle eversion: (20°): 20  L ankle eversion: (20°): 20  Ankle MMT  R ankle dorsiflexion: (5/5): 5/5  L ankle dorsiflexion: (5/5): 4+/5  R ankle plantarflexion: (5/5): 20 reps  L ankle plantarflexion: (5/5): 20 reps (50% WB)  R ankle inversion: (5/5): 5/5  L ankle inversion: (5/5): 4+/5  R ankle eversion: (5/5): 5/5  L ankle eversion: (5/5): 4+/5  Special Tests     Joint Mobility Testing  Joint Mobility Comment: anterior TC stiffness  Gait  Gait Comment: antalgic gait with decrease stance time L compared to right and poor push off  Flexibility       Effusion: 0      Lower Extremity Functional Movements  Gait: Decreased gait speed, Decreased stride length, and Decreased stance time  SL Balance: contralateral pelvic drop, Increased pronation, and L>R  DL Squat: WNL left anterior TC pain      Outcome Measures:  Other Measures  Lower Extremity Funtional Score (LEFS): 49/80       Treatment Performed:  Therapeutic Exercise  Therapeutic Exercise Activity 1: review of intrinsic HEP  Therapeutic Exercise Activity 2: ankle DF self mob with green rogue band and 6\" step  Therapeutic Exercise Activity 3: soleus " stretch  Therapeutic Exercise Activity 4: bilateral heel raise  Therapeutic Exercise Activity 5: single leg balance X reach    Manual Therapy  Manual Therapy Activity 1: TC AP grades 3-4 (improvements in deep squat)      Assessment: Pt is a 24 y.o. y.o male presenting to the clinic with s/p ORIF left fifth metatarsal fracture with calcaneus bone graft on 7/15/2024  Pt demonstrates limitations in  LLE Strength, left  ROM, Flexibility of gastroc/soleus, Dynamic Motor Control, Balance, Gait Mechanics, and Pain. As a result, is limiting their ability to perform ADL's and their functional activities. Signs and symptoms present are consistent with post operative impairments of foot/ankle. Pt demonstrates to be a good candidate for PT, where the benefit would benefit from Strengthening, ROM, Stretching, Motor Control Training, Balance Training, and Gait Training, in order to return to PLOF.       Clinical Presentation: Stable and/or uncomplicated characteristics    EDUCATION: home exercise program, plan of care, activity modifications, pain management, and injury pathology  Tissue heel time  Return to sport battery      Plan:     Planned Interventions include: therapeutic exercise, self-care home management, manual therapy, therapeutic activities, gait training, neuromuscular coordination, vasopneumatic, dry needling, aquatic therapy  Frequency: 2 x Week  Duration: 8 Weeks  Rehab Potential: Excellent  Agreement: Pt agreed to plan of care    Goals:  Active       PT Problem       PT Goal 1       Start:  08/29/24    Expected End:  10/24/24       By discharge, patient will:  Demonstrate independence with home exercise program  Tolerate increased exercise without adverse reaction  Demonstrate improved posture & proper body mechanics throughout session  Increase left ankle ROM to pain free + WNL  Increase left snkle strength to pain free + 5/5 throughout  Report decrease in pain by > 2 points to meet the MCID  Increase score of  LEFS by > 9 points to meet the MCID  Ascend / descend stairs without an increase in symptoms  Ambulate x 1000' without an increase in symptoms  Pass return to sport battery

## 2024-09-03 ENCOUNTER — TREATMENT (OUTPATIENT)
Dept: PHYSICAL THERAPY | Facility: HOSPITAL | Age: 24
End: 2024-09-03
Payer: COMMERCIAL

## 2024-09-03 DIAGNOSIS — R29.898 LEFT LEG WEAKNESS: ICD-10-CM

## 2024-09-03 DIAGNOSIS — Z47.89 ORTHOPEDIC AFTERCARE: ICD-10-CM

## 2024-09-03 DIAGNOSIS — R26.2 DIFFICULTY WALKING: ICD-10-CM

## 2024-09-03 DIAGNOSIS — S92.352D DISPLACED FRACTURE OF FIFTH METATARSAL BONE OF LEFT FOOT WITH ROUTINE HEALING: Primary | ICD-10-CM

## 2024-09-03 DIAGNOSIS — M25.672 ANKLE STIFFNESS, LEFT: ICD-10-CM

## 2024-09-03 PROCEDURE — 97016 VASOPNEUMATIC DEVICE THERAPY: CPT | Mod: GP | Performed by: PHYSICAL THERAPIST

## 2024-09-03 PROCEDURE — 97110 THERAPEUTIC EXERCISES: CPT | Mod: GP | Performed by: PHYSICAL THERAPIST

## 2024-09-03 ASSESSMENT — PAIN SCALES - GENERAL: PAINLEVEL_OUTOF10: 1

## 2024-09-03 ASSESSMENT — PAIN - FUNCTIONAL ASSESSMENT: PAIN_FUNCTIONAL_ASSESSMENT: 0-10

## 2024-09-03 NOTE — PROGRESS NOTES
Physical Therapy  Physical Therapy Treatment Note    Patient Name: Darci Eastman  MRN: [id number]  Today's Date: 9/3/2024  Time Calculation  Start Time: 0700  Stop Time: 0800  Time Calculation (min): 60 min    Insurance:  Visit number: 2 of MN  Authorization info: no auth  Insurance Type: [facility]       Current Problem  1. Displaced fracture of fifth metatarsal bone of left foot with routine healing  Follow Up In Physical Therapy      2. Orthopedic aftercare  Follow Up In Physical Therapy      3. Ankle stiffness, left  Follow Up In Physical Therapy      4. Difficulty walking  Follow Up In Physical Therapy      5. Left leg weakness  Follow Up In Physical Therapy          Precautions: Precautions  LE Weight Bearing Status: Weight Bearing as Tolerated    General  Reason for Referral: s/p ORIF left fifth metatarsal fracture with calcaneus bone graft  Referred By: Dr. Jamshid Montgomery MD    Pain  Pain Assessment: 0-10  0-10 (Numeric) Pain Score: 1    Subjective:   Patient reports he is doing well overall. Pt states he is still utilizing crutches for long distances. Pt has been spending the majority of time in boot.      Performing HEP?: Yes      Objective:   ANKLE    Ankle AROM  R ankle dorsiflexion: (10°): 15  L ankle dorsiflexion: (10°): 15  R ankle plantarflexion: (40°): 40  L ankle plantarflexion: (40°): 40  R ankle inversion: (30°): 30  L ankle inversion: (30°): 30  R ankle eversion: (20°): 20  L ankle eversion: (20°): 18        Treatment Performed:  Therapeutic Exercise  Therapeutic Exercise Activity 1: upright bike x6 min  Therapeutic Exercise Activity 2: resisted side steeping green TB loop 5 yards x6  Therapeutic Exercise Activity 3: resisted fwd/retro mon  Therapeutic Exercise Activity 4: single leg stance with KB rotations around waist 3x10  Therapeutic Exercise Activity 5: single elg KB RDL pink 3x12  Therapeutic Exercise Activity 6: BFR 80% knee extensions 10# ankle weights 30-15-15-15  Therapeutic  Exercise Activity 7: BFR 80% total gym leg press seat zero no cord 30-15-15-15  Therapeutic Exercise Activity 8: BFR 80% heel raises 30-15-15-15    Modalities  Modality 1: Untimed Vasopneumatic (mod compression 34 deg)        Assessment:   The focus of the session was Strengthening, ROM, Stretching, and Dynamic Stability Training. The pt demonstrated Good tolerance to the noted exercises today. The pt is demonstrated Good progress in skilled rehab at this time. The pt is still limited in overall Strength, ROM, Motor control, Balance, Gait mechanics, and Pain at this time. The pt continues to be a good candidate for skilled PT, in order to further improve Strength, ROM, Flexibility, Motor control, Balance, Gait mechanics, and Pain.     Education: weaning out of boot    Plan:  Progress LE strengthening with BFR. Continue to monitor tolerance to weaning out of boot. Progress single leg stability    Goals:  Active       PT Problem       PT Goal 1       Start:  08/29/24    Expected End:  10/24/24       By discharge, patient will:  Demonstrate independence with home exercise program  Tolerate increased exercise without adverse reaction  Demonstrate improved posture & proper body mechanics throughout session  Increase left ankle ROM to pain free + WNL  Increase left snkle strength to pain free + 5/5 throughout  Report decrease in pain by > 2 points to meet the MCID  Increase score of LEFS by > 9 points to meet the MCID  Ascend / descend stairs without an increase in symptoms  Ambulate x 1000' without an increase in symptoms  Pass return to sport battery               Lavelle Sandhu PT, DPT, OCS, C-OMPT, ITPT

## 2024-09-05 ENCOUNTER — APPOINTMENT (OUTPATIENT)
Dept: PHYSICAL THERAPY | Facility: HOSPITAL | Age: 24
End: 2024-09-05
Payer: COMMERCIAL

## 2024-09-06 ENCOUNTER — TREATMENT (OUTPATIENT)
Dept: PHYSICAL THERAPY | Facility: HOSPITAL | Age: 24
End: 2024-09-06
Payer: COMMERCIAL

## 2024-09-06 DIAGNOSIS — S92.352D DISPLACED FRACTURE OF FIFTH METATARSAL BONE OF LEFT FOOT WITH ROUTINE HEALING: ICD-10-CM

## 2024-09-06 DIAGNOSIS — R29.898 LEFT LEG WEAKNESS: ICD-10-CM

## 2024-09-06 DIAGNOSIS — M25.672 ANKLE STIFFNESS, LEFT: ICD-10-CM

## 2024-09-06 DIAGNOSIS — Z47.89 ORTHOPEDIC AFTERCARE: ICD-10-CM

## 2024-09-06 DIAGNOSIS — R26.2 DIFFICULTY WALKING: ICD-10-CM

## 2024-09-06 PROCEDURE — 97110 THERAPEUTIC EXERCISES: CPT | Mod: GP

## 2024-09-06 NOTE — PROGRESS NOTES
Physical Therapy  Physical Therapy Treatment Note    Patient Name: Darci Eastman  MRN: [id number]  Today's Date: 9/6/2024  Time Calculation  Start Time: 0700  Stop Time: 0803  Time Calculation (min): 63 min    Current Problem  Problem List Items Addressed This Visit    None  Visit Diagnoses         Codes    Displaced fracture of fifth metatarsal bone of left foot with routine healing     S92.352D    Orthopedic aftercare     Z47.89    Ankle stiffness, left     M25.672    Difficulty walking     R26.2    Left leg weakness     R29.898            Visit #  3    Insurance:  Authorization info: no auth  Insurance Type: [employer] [place]    Precautions: Precautions  LE Weight Bearing Status: Weight Bearing as Tolerated     General  Reason for Referral: s/p ORIF left fifth metatarsal fracture with calcaneus bone graft  Referred By: Dr. Jamshid Montgomery MD     Subjective  General: Patient presented to PT reporting  that he has been out of the boot fully since last Tuesday . States that he is experiencing some lateral ankle/foot pain on R with biking.     Pain:   Intensity: 0/10  Location: L foot  Description: NA     HEP Update:   Compliant: yes      Objective  Ankle AROM  R ankle dorsiflexion: (10°): 15  L ankle dorsiflexion: (10°): 15  R ankle plantarflexion: (40°): 40  L ankle plantarflexion: (40°): 40  R ankle inversion: (30°): 30  L ankle inversion: (30°): 30  R ankle eversion: (20°): 20  L ankle eversion: (20°): 18     Treatment    Therapeutic Exercise:     63 minutes  upright bike x6 min  resisted side steeping green TB loop at feet 5 yards x6  resisted fwd/retro mon green TB loop at ankles 5 yards x6  single leg stance with KB rotations around waist 3x10 NP  step down 6 inch box 3x12   single leg KB RDL blue elevated on 4 inch box 3x12  BFR 80% total gym single leg press seat 2 no cord 30-15-15-15  BFR 80% total gym SL heel raise eccentric 30-15-15-15  BFR 80% machine SL LAQ 25# 30-15-15-15  BFR 80% prone HS curl  "30-15-15-15    Manual Therapy:        minutes  none    Neuromuscular Re-education:     minutes  none    Modalities:        minutes  none    Gait Training:            minutes  none      Assessment    Today's session was focused on strength and SL stability . Patient was progressed through increased resistance with familiar exercises and instruction of anterior heel tap with 3\" eccentric . Attempted SL heel raise with BFR, but discontinued due to lateral foot discomfort. They required min verbal cueing and tactile cueing to complete today's progression with proper form. Session was tolerated well without any increase in pain or irritation over baseline. Darci is progressing towards his goals as evidenced by improvement in pain levels, strength, and tolerance to functional activity. At this time, the patient would continue to benefit from skilled PT to address remaining functional, objective and subjective deficits to allow them to return to their prior level of function.    Plan  Progress SL stability and strength as tolerated. Monitor      Walt Tate, PT  "

## 2024-09-10 ENCOUNTER — TREATMENT (OUTPATIENT)
Dept: PHYSICAL THERAPY | Facility: HOSPITAL | Age: 24
End: 2024-09-10
Payer: COMMERCIAL

## 2024-09-10 ENCOUNTER — OFFICE VISIT (OUTPATIENT)
Dept: ORTHOPEDIC SURGERY | Facility: CLINIC | Age: 24
End: 2024-09-10
Payer: COMMERCIAL

## 2024-09-10 ENCOUNTER — HOSPITAL ENCOUNTER (OUTPATIENT)
Dept: RADIOLOGY | Facility: CLINIC | Age: 24
Discharge: HOME | End: 2024-09-10
Payer: COMMERCIAL

## 2024-09-10 DIAGNOSIS — M25.672 ANKLE STIFFNESS, LEFT: ICD-10-CM

## 2024-09-10 DIAGNOSIS — R29.898 LEFT LEG WEAKNESS: ICD-10-CM

## 2024-09-10 DIAGNOSIS — S92.352A CLOSED FRACTURE OF BASE OF FIFTH METATARSAL BONE OF LEFT FOOT, INITIAL ENCOUNTER: ICD-10-CM

## 2024-09-10 DIAGNOSIS — R26.2 DIFFICULTY WALKING: ICD-10-CM

## 2024-09-10 DIAGNOSIS — S92.352D DISPLACED FRACTURE OF FIFTH METATARSAL BONE OF LEFT FOOT WITH ROUTINE HEALING: ICD-10-CM

## 2024-09-10 DIAGNOSIS — Z47.89 ORTHOPEDIC AFTERCARE: ICD-10-CM

## 2024-09-10 PROCEDURE — 73630 X-RAY EXAM OF FOOT: CPT | Mod: LEFT SIDE | Performed by: RADIOLOGY

## 2024-09-10 PROCEDURE — 97110 THERAPEUTIC EXERCISES: CPT | Mod: GP | Performed by: PHYSICAL THERAPIST

## 2024-09-10 PROCEDURE — 99211 OFF/OP EST MAY X REQ PHY/QHP: CPT | Performed by: STUDENT IN AN ORGANIZED HEALTH CARE EDUCATION/TRAINING PROGRAM

## 2024-09-10 PROCEDURE — 73630 X-RAY EXAM OF FOOT: CPT | Mod: LT

## 2024-09-10 ASSESSMENT — PAIN SCALES - GENERAL
PAINLEVEL_OUTOF10: 2
PAINLEVEL_OUTOF10: 1

## 2024-09-10 ASSESSMENT — PAIN DESCRIPTION - DESCRIPTORS: DESCRIPTORS: ACHING;DISCOMFORT;DULL

## 2024-09-10 ASSESSMENT — PAIN - FUNCTIONAL ASSESSMENT
PAIN_FUNCTIONAL_ASSESSMENT: 0-10
PAIN_FUNCTIONAL_ASSESSMENT: 0-10

## 2024-09-10 NOTE — PROGRESS NOTES
Physical Therapy  Physical Therapy Treatment Note    Patient Name: Darci Eastman  MRN: [id number]  Today's Date: 9/10/2024  Time Calculation  Start Time: 0730  Stop Time: 0815  Time Calculation (min): 45 min    Insurance:  Visit number: 4 of [place]  Authorization info: no auth  Insurance Type: [facility]    Current Problem  1. Displaced fracture of fifth metatarsal bone of left foot with routine healing  Follow Up In Physical Therapy      2. Orthopedic aftercare  Follow Up In Physical Therapy      3. Ankle stiffness, left  Follow Up In Physical Therapy      4. Difficulty walking  Follow Up In Physical Therapy      5. Left leg weakness  Follow Up In Physical Therapy          Precautions: Precautions  LE Weight Bearing Status: Weight Bearing as Tolerated    General  Reason for Referral: s/p ORIF left fifth metatarsal fracture with calcaneus bone graft  Referred By: Dr. Jamshid Montgomery MD    Pain  Pain Assessment: 0-10  0-10 (Numeric) Pain Score: 1    Subjective:   Patient reports he noticed some increase pain and soreness in bilateral peroneal area following last session. Pt states it has resolved. Pt reports weaning out of boot is going well but notices with transition from DF to PF causes dorsal foot pain.       Performing HEP?: Yes      Objective:   ANKLE  Ankle AROM  R ankle dorsiflexion: (10°): 15  L ankle dorsiflexion: (10°): 15  R ankle plantarflexion: (40°): 40  L ankle plantarflexion: (40°): 40  R ankle inversion: (30°): 30  L ankle inversion: (30°): 30  R ankle eversion: (20°): 20  L ankle eversion: (20°): 18      Treatment Performed:  Therapeutic Exercise  Therapeutic Exercise Activity 1: upright bike x6 min  Therapeutic Exercise Activity 2: resisted side steeping green TB loop 5 yards x6  Therapeutic Exercise Activity 3: fwd lunge on BOSU 2x10 each  Therapeutic Exercise Activity 4: lateral lunge on BOSU 2x10 each  Therapeutic Exercise Activity 5: kieser cable RDL+ row 10# 2x12  Therapeutic Exercise  Activity 6: BFR 80% knee extensions 10# ankle weights 30-15-15-15  Therapeutic Exercise Activity 7: BFR 80% total gym leg press seat zero no cord 30-15-15-15  Therapeutic Exercise Activity 8: BFR 80% heel raises 30-15-15-15        Assessment:   The focus of the session was Strengthening, Balance, Motor Control, and Dynamic Stability Training. The pt demonstrated Good tolerance to the noted exercises today. Had slight increase in pain post exercise. The pt is demonstrated Good progress in skilled rehab at this time. The pt is still limited in overall Strength, ROM, Flexibility, Motor control, Balance, Gait mechanics, and Pain at this time. The pt continues to be a good candidate for skilled PT, in order to further improve Strength, ROM, Flexibility, Motor control, Balance, Gait mechanics, and Pain.     Education: continue to assess tolerance to WB    Plan:  Continue LE strength as tolerated and single leg stability work    Goals:  Active       PT Problem       PT Goal 1       Start:  08/29/24    Expected End:  10/24/24       By discharge, patient will:  Demonstrate independence with home exercise program  Tolerate increased exercise without adverse reaction  Demonstrate improved posture & proper body mechanics throughout session  Increase left ankle ROM to pain free + WNL  Increase left snkle strength to pain free + 5/5 throughout  Report decrease in pain by > 2 points to meet the MCID  Increase score of LEFS by > 9 points to meet the MCID  Ascend / descend stairs without an increase in symptoms  Ambulate x 1000' without an increase in symptoms  Pass return to sport battery               Lavelle Sandhu PT, DPT, OCS, C-OMPT, ITPT

## 2024-09-10 NOTE — PROGRESS NOTES
ORTHOPAEDIC SURGERY PROGRESS NOTE    Chief Complaint:  Left foot pain    History Of Present Illness  Darci Eastman is a 24 y.o. male who presents for evaluation of left foot pain as a referral from Dr. Chan.  Patient initially sustained an injury to his left foot and October 2023.  Portions of the history are provided both by the patient as well as EMR and review of care everywhere.  This was treated conservatively.  Patient was later seen in January 2024 with mild persistent pain.  He was then seen on 06/17/2024 for acute on chronic left foot pain.  He sustained a awkward landing while playing ultimate Frisbee from 06/14/2024.  He actually thinks he was going up for the [employer] when he noticed pain.  He was seen in an urgent care setting time and made nonweightbearing in his left lower extremity in a fracture boot.    Patient was intermittently treated for presumed peroneal tendinitis after attending a walk-in therapy student clinic at [employer].  He has been able to continue playing but has noticed a nagging pain.  He has been able to play through the pain.    He currently reports minimal with pain nonweightbearing.  He has been compliant with nonweightbearing restrictions in a tall walking boot.  He has had a recent MRI and is here for review.    Patient does not use tobacco products.  He has recently graduated and is on the job hunt.    07/31/2024: Patient returns s/p ORIF left fifth metatarsal fracture with calcaneus bone graft from 07/15/2024.  Patient has been compliant with nonweightbearing restrictions utilizing a knee scooter.  He has been on aspirin for DVT prophylaxis.  He has had noted improvement in his right foot pain from prior to surgery.  He has been mobilizing his right foot.  He is not reporting any numbness in his left foot.  He is maintained on aspirin for DVT prophylaxis and VitD/Ca.    08/13/2024: Patient returns s/p ORIF left fifth metatarsal fracture with calcaneus bone graft from  07/15/2024.  Patient is reporting 3 out of 10 pain in the left foot, that is gradually improving.  He has been compliant with nonweightbearing restrictions utilizing a knee scooter.  He has been on aspirin for DVT prophylaxis.  He notes continued improvement with respect to his right foot pain.  He denies numbness, tingling or weakness in the LLE.    09/10/2024: Patient returns s/p ORIF left fifth metatarsal fracture with calcaneus bone graft from 07/15/2024.  He is currently reporting 2 out of 10 pain.  He has been in physical therapy.  He has fully transitioned out of the walking boot, he continues to note some pain particularly with lateral movement.  He is comfortable walking but does not yet fully trust his foot for support.     Past Medical History  Past Medical History:   Diagnosis Date    Closed fracture of base of fifth metatarsal bone of left foot, initial encounter        Surgical History  Recent Surgeries in Orthopaedic Surgery            No cases to display             Social History  Social History     Socioeconomic History    Marital status: Single   Tobacco Use    Smoking status: Never    Smokeless tobacco: Never   Vaping Use    Vaping status: Never Used   Substance and Sexual Activity    Alcohol use: Yes     Alcohol/week: 6.0 standard drinks of alcohol     Types: 6 Standard drinks or equivalent per week    Drug use: Yes     Types: Marijuana     Comment: last use April - no other drugs    Sexual activity: Defer       Family History  Family History   Problem Relation Name Age of Onset    ALS Mother      Hypertension Father      No Known Problems Sister      No Known Problems Brother          Allergies  No Known Allergies    Review of Systems  REVIEW OF SYSTEMS  Constitutional: no unplanned weight loss  Psychiatric: no suicidal ideation  ENT: no vision changes, no sinus problems  Pulmonary: no shortness of breath  Lymphatic: no enlarged lymph nodes  Cardiovascular: no chest pain or shortness of  breath  Gastrointestinal: no stomach problems  Genitourinary: no dysuria   Skin: no rashes  Endocrine: no thyroid problems  Neurological: no headache, no numbness  Hematological: no easy bruising  Musculoskeletal: Left foot pain    Physical Exam  PHYSICAL EXAMINATION  Constitutional Exam: well developed and well nourished  Psychiatric Exam: alert and oriented, appropriate mood and behavior  Eye Exam: EOMI  Pulmonary Exam: breathing non-labored, no apparent distress  Lymphatic exam: no appreciable lymphadenopathy in the lower extremities  Cardiovascular exam: RRR to peripheral palpation, DP pulses 2+, PT 2+, toes are pink with good capillary refill, no pitting edema  Skin exam: no open lesions, rashes, abrasions or ulcerations  Neurological exam: sensation to light touch intact in both lower extremities in peripheral and dermatomal distributions (except for any abnormalities noted in musculoskeletal exam)    Musculoskeletal exam: Left lower extremity examination.  Patient calcaneal and lateral fifth metatarsal incisions well-appearing.  Nontender to palpation on examination at both sites.  No pain with attempted lateral or midfoot stress. Patient has pain-free and supple ankle, subtalar midtarsal joint range of motion.  Patient has sensation intact to light touch grossly in a saphenous, sural, superficial peroneal, deep peroneal and tibial nerve distribution. He has intact PF/DF/EHL. 2+ Dp/pt pulses.    Last Recorded Vitals  There were no vitals taken for this visit.    Laboratory Results    No results found for this or any previous visit (from the past 24 hour(s)).    Radiology Results   X-ray imaging 3 view weightbearing left foot reviewed from 09/10/2024 and independently evaluated by me demonstrates no acute fracture or dislocation.  There is appropriate position alignment following plantar plate fixation of the fifth metatarsal with appropriate screw length and trajectory, no obvious margarita-implant fracture,  lucency or loosening.  Continued note of fracture consolidation.    Assessment/Plan:  24-year-old male s/p ORIF left fifth metatarsal fracture with calcaneus bone graft from 07/15/2024 who presents with continued note of clinical and radiographic evidence of healing.  I will recommend the patient continue weightbearing to his tolerance in his left lower extremity.  He will continue in physical therapy at this time.  I will plan to see the patient back in approximate 1 month for repeat clinical and radiographic evaluation.  I would anticipate ordering a CT scan of his left foot to more completely evaluate for osseous bridging prior to returning to impact and high level athletics.  Upon return, patient would require three-view weightbearing left foot.    Jamshid Montgomery MD, LAURA  Department of Orthopaedic Surgery  [facility]    The diagnosis and treatment plan were reviewed with the patient. All questions were answered. The patient verbalized understanding of the treatment plan. There were no barriers to understanding identified.    Note dictated with [employer] software.  Completed without full type editing and sent to avoid delay.

## 2024-09-12 ENCOUNTER — TREATMENT (OUTPATIENT)
Dept: PHYSICAL THERAPY | Facility: HOSPITAL | Age: 24
End: 2024-09-12
Payer: COMMERCIAL

## 2024-09-12 DIAGNOSIS — S92.352D DISPLACED FRACTURE OF FIFTH METATARSAL BONE OF LEFT FOOT WITH ROUTINE HEALING: ICD-10-CM

## 2024-09-12 DIAGNOSIS — Z47.89 ORTHOPEDIC AFTERCARE: ICD-10-CM

## 2024-09-12 DIAGNOSIS — M25.672 ANKLE STIFFNESS, LEFT: ICD-10-CM

## 2024-09-12 DIAGNOSIS — R26.2 DIFFICULTY WALKING: ICD-10-CM

## 2024-09-12 DIAGNOSIS — R29.898 LEFT LEG WEAKNESS: ICD-10-CM

## 2024-09-12 PROCEDURE — 97110 THERAPEUTIC EXERCISES: CPT | Mod: GP | Performed by: PHYSICAL THERAPIST

## 2024-09-12 PROCEDURE — 97112 NEUROMUSCULAR REEDUCATION: CPT | Mod: GP | Performed by: PHYSICAL THERAPIST

## 2024-09-12 PROCEDURE — 97016 VASOPNEUMATIC DEVICE THERAPY: CPT | Mod: GP | Performed by: PHYSICAL THERAPIST

## 2024-09-12 ASSESSMENT — PAIN SCALES - GENERAL: PAINLEVEL_OUTOF10: 1

## 2024-09-12 ASSESSMENT — PAIN - FUNCTIONAL ASSESSMENT: PAIN_FUNCTIONAL_ASSESSMENT: 0-10

## 2024-09-12 NOTE — PROGRESS NOTES
"  Physical Therapy  Physical Therapy Treatment Note    Patient Name: Darci Eastman  MRN: [id number]  Today's Date: 9/12/2024  Time Calculation  Start Time: 0700  Stop Time: 0800  Time Calculation (min): 60 min    Insurance:  Visit number: 5 of [place]  Authorization info: no auth  Insurance Type: [facility]    Current Problem  1. Displaced fracture of fifth metatarsal bone of left foot with routine healing  Follow Up In Physical Therapy      2. Orthopedic aftercare  Follow Up In Physical Therapy      3. Ankle stiffness, left  Follow Up In Physical Therapy      4. Difficulty walking  Follow Up In Physical Therapy      5. Left leg weakness  Follow Up In Physical Therapy          Precautions: Precautions  LE Weight Bearing Status: Weight Bearing as Tolerated    General  Reason for Referral: s/p ORIF left fifth metatarsal fracture with calcaneus bone graft  Referred By: Dr. Jamshid Montgomery MD    Pain  Pain Assessment: 0-10  0-10 (Numeric) Pain Score: 1    Subjective:   Patient reports he is doing well overall. Pt had a good follow up with Dr. Montgomery and x-rays showed good healing      Performing HEP?: Yes      Objective:         Treatment Performed:  Therapeutic Exercise  Therapeutic Exercise Activity 1: upright bike x6 min  Therapeutic Exercise Activity 2: resisted side steeping green TB loop 5 yards x6  Therapeutic Exercise Activity 3: gastroc stretching on slant board 2x40\"  Therapeutic Exercise Activity 4: 3 way lunge blue KB 5 rounds x3 sets each    Balance/Neuromuscular Re-Education  Balance/Neuromuscular Re-Education Activity 1: Kieser Paloff Press 10# 2x15 each  Balance/Neuromuscular Re-Education Activity 2: Blaze PODS single leg balance 3x30\" each  Balance/Neuromuscular Re-Education Activity 3: BOSU step up with 10# medball toss 3x10  Balance/Neuromuscular Re-Education Activity 4: sports cord lateral step and hold 3x20 each  Balance/Neuromuscular Re-Education Activity 5: sports cord fwd step and hold " 3x20        Assessment:   The focus of the session was Strengthening, ROM, Balance, Motor Control, and functional training. The pt demonstrated Good tolerance to the noted exercises today. The pt is demonstrated Good progress in skilled rehab at this time. The pt is still limited in overall Strength, ROM, Flexibility, Motor control, Balance, Gait mechanics, and Pain at this time. The pt continues to be a good candidate for skilled PT, in order to further improve Strength, ROM, Flexibility, Motor control, Balance, Gait mechanics, and Pain.     Education: updated HEP    Plan:  Possible trial of alter g    Goals:  Active       PT Problem       PT Goal 1       Start:  08/29/24    Expected End:  10/24/24       By discharge, patient will:  Demonstrate independence with home exercise program  Tolerate increased exercise without adverse reaction  Demonstrate improved posture & proper body mechanics throughout session  Increase left ankle ROM to pain free + WNL  Increase left snkle strength to pain free + 5/5 throughout  Report decrease in pain by > 2 points to meet the MCID  Increase score of LEFS by > 9 points to meet the MCID  Ascend / descend stairs without an increase in symptoms  Ambulate x 1000' without an increase in symptoms  Pass return to sport battery               Lavelle Sandhu PT, DPT, OCS, C-OMPT, ITPT

## 2024-09-17 ENCOUNTER — TREATMENT (OUTPATIENT)
Dept: PHYSICAL THERAPY | Facility: HOSPITAL | Age: 24
End: 2024-09-17
Payer: COMMERCIAL

## 2024-09-17 DIAGNOSIS — S92.352D DISPLACED FRACTURE OF FIFTH METATARSAL BONE OF LEFT FOOT WITH ROUTINE HEALING: ICD-10-CM

## 2024-09-17 DIAGNOSIS — M25.672 ANKLE STIFFNESS, LEFT: ICD-10-CM

## 2024-09-17 DIAGNOSIS — R29.898 LEFT LEG WEAKNESS: ICD-10-CM

## 2024-09-17 DIAGNOSIS — R26.2 DIFFICULTY WALKING: ICD-10-CM

## 2024-09-17 DIAGNOSIS — Z47.89 ORTHOPEDIC AFTERCARE: ICD-10-CM

## 2024-09-17 PROCEDURE — 97112 NEUROMUSCULAR REEDUCATION: CPT | Mod: GP | Performed by: PHYSICAL THERAPIST

## 2024-09-17 PROCEDURE — 97110 THERAPEUTIC EXERCISES: CPT | Mod: GP | Performed by: PHYSICAL THERAPIST

## 2024-09-17 ASSESSMENT — PAIN - FUNCTIONAL ASSESSMENT: PAIN_FUNCTIONAL_ASSESSMENT: 0-10

## 2024-09-17 ASSESSMENT — PAIN SCALES - GENERAL: PAINLEVEL_OUTOF10: 0 - NO PAIN

## 2024-09-17 NOTE — PROGRESS NOTES
"  Physical Therapy  Physical Therapy Treatment Note    Patient Name: Draci Eastman  MRN: [id number]  Today's Date: 9/17/2024  Time Calculation  Start Time: 0700  Stop Time: 0800  Time Calculation (min): 60 min    Insurance:  Visit number: 6 of [place]  Authorization info: no auth  Insurance Type: [facility]       Current Problem  1. Displaced fracture of fifth metatarsal bone of left foot with routine healing  Follow Up In Physical Therapy      2. Orthopedic aftercare  Follow Up In Physical Therapy      3. Ankle stiffness, left  Follow Up In Physical Therapy      4. Difficulty walking  Follow Up In Physical Therapy      5. Left leg weakness  Follow Up In Physical Therapy          Precautions: Precautions  LE Weight Bearing Status: Weight Bearing as Tolerated    General  Reason for Referral: s/p ORIF left fifth metatarsal fracture with calcaneus bone graft  Referred By: Dr. Jamshid Montgomery MD    Pain  Pain Assessment: 0-10  0-10 (Numeric) Pain Score: 0 - No pain    Subjective:   Patient reports he is feeling really well overall. Pt reports he continues to lift outside of clinic      Performing HEP?: Yes      Objective:         Treatment Performed:  Therapeutic Exercise  Therapeutic Exercise Activity 1: upright bike x6 min (4min TABATA)  Therapeutic Exercise Activity 2: resisted side steeping green TB loop 5 yards x6  Therapeutic Exercise Activity 3: gastroc stretching on slant board 2x40\"  Therapeutic Exercise Activity 4: BFR 80% total gym SL press 1 cord seat 5 30-15-15-15  Therapeutic Exercise Activity 5: BFR 80% BL heel raise 30-15-15-15  Therapeutic Exercise Activity 6: BFR 80% matrix SL extension 35# 30-15-15-15    Balance/Neuromuscular Re-Education  Balance/Neuromuscular Re-Education Activity 1: 3 way toe tap on AIREX 5x3 rounds each  Balance/Neuromuscular Re-Education Activity 2: single leg stance on black mobility disc into warrior 3 3x8  Balance/Neuromuscular Re-Education Activity 3: single leg KB pink KB with "  contralateral resisted hip adduction orange rogue band 3x12  Balance/Neuromuscular Re-Education Activity 4: single leg med ball slams anterior on AIREX      Assessment:   The focus of the session was Strengthening, ROM, Dynamic Stability Training, and functional training. The pt demonstrated Good tolerance to the noted exercises today. The pt is demonstrated Good progress in skilled rehab at this time. The pt is still limited in overall Strength, ROM, Motor control, Balance, and Pain at this time. The pt continues to be a good candidate for skilled PT, in order to further improve Strength, ROM, Flexibility, Motor control, Gait mechanics, and Pain.     Education: continue with HEP as prescribed    Plan:  Continue to progress stability work and strength with BFR    Goals:  Active       PT Problem       PT Goal 1       Start:  08/29/24    Expected End:  10/24/24       By discharge, patient will:  Demonstrate independence with home exercise program  Tolerate increased exercise without adverse reaction  Demonstrate improved posture & proper body mechanics throughout session  Increase left ankle ROM to pain free + WNL  Increase left snkle strength to pain free + 5/5 throughout  Report decrease in pain by > 2 points to meet the MCID  Increase score of LEFS by > 9 points to meet the MCID  Ascend / descend stairs without an increase in symptoms  Ambulate x 1000' without an increase in symptoms  Pass return to sport battery               Lavelle Sandhu PT, DPT, OCS, C-OMPT, ITPT

## 2024-09-19 ENCOUNTER — TREATMENT (OUTPATIENT)
Dept: PHYSICAL THERAPY | Facility: HOSPITAL | Age: 24
End: 2024-09-19
Payer: COMMERCIAL

## 2024-09-19 DIAGNOSIS — M25.672 ANKLE STIFFNESS, LEFT: ICD-10-CM

## 2024-09-19 DIAGNOSIS — R26.2 DIFFICULTY WALKING: ICD-10-CM

## 2024-09-19 DIAGNOSIS — S92.352D DISPLACED FRACTURE OF FIFTH METATARSAL BONE OF LEFT FOOT WITH ROUTINE HEALING: ICD-10-CM

## 2024-09-19 DIAGNOSIS — R29.898 LEFT LEG WEAKNESS: ICD-10-CM

## 2024-09-19 DIAGNOSIS — Z47.89 ORTHOPEDIC AFTERCARE: ICD-10-CM

## 2024-09-19 PROCEDURE — 97112 NEUROMUSCULAR REEDUCATION: CPT | Mod: GP | Performed by: PHYSICAL THERAPIST

## 2024-09-19 PROCEDURE — 97110 THERAPEUTIC EXERCISES: CPT | Mod: GP | Performed by: PHYSICAL THERAPIST

## 2024-09-19 ASSESSMENT — PAIN - FUNCTIONAL ASSESSMENT: PAIN_FUNCTIONAL_ASSESSMENT: 0-10

## 2024-09-19 ASSESSMENT — PAIN SCALES - GENERAL: PAINLEVEL_OUTOF10: 0 - NO PAIN

## 2024-09-19 NOTE — PROGRESS NOTES
"  Physical Therapy  Physical Therapy Treatment Note    Patient Name: Darci Eastman  MRN: [id number]  Today's Date: 9/19/2024  Time Calculation  Start Time: 0700  Stop Time: 0800  Time Calculation (min): 60 min    Insurance:  Visit number: 7 of [place]  Authorization info: no auth  Insurance Type: [facility]    Current Problem  1. Displaced fracture of fifth metatarsal bone of left foot with routine healing  Follow Up In Physical Therapy      2. Orthopedic aftercare  Follow Up In Physical Therapy      3. Ankle stiffness, left  Follow Up In Physical Therapy      4. Difficulty walking  Follow Up In Physical Therapy      5. Left leg weakness  Follow Up In Physical Therapy          Precautions: Precautions  LE Weight Bearing Status: Weight Bearing as Tolerated    General  Reason for Referral: s/p ORIF left fifth metatarsal fracture with calcaneus bone graft  Referred By: Dr. Jamshid Montgomery MD    Pain  Pain Assessment: 0-10  0-10 (Numeric) Pain Score: 0 - No pain    Subjective:   Patient reports he is feeling well overall. Pt states that his legs were sore the past couple days secondary to lifting.      Performing HEP?: Yes      Objective:         Treatment Performed:  Therapeutic Exercise  Therapeutic Exercise Activity 1: upright bike x6 min (4min TABATA)  Therapeutic Exercise Activity 2: resisted side steeping green TB loop 5 yards x6  Therapeutic Exercise Activity 3: gastroc stretching on slant board 2x40\"  Therapeutic Exercise Activity 4: retro sled pull 10 yards x4    Balance/Neuromuscular Re-Education  Balance/Neuromuscular Re-Education Activity 1: fwd step up on BOSU 2x10  Balance/Neuromuscular Re-Education Activity 2: lateral step up on BOSU 2x10  Balance/Neuromuscular Re-Education Activity 3: fwd heel tap 6\" rogue box 2x12  Balance/Neuromuscular Re-Education Activity 4: SL stance on black disc med ball RDL to overhead 3x12 each  Balance/Neuromuscular Re-Education Activity 5: sports cord fwd and backward decels " walking 2x15 each  Balance/Neuromuscular Re-Education Activity 6: single leg stance on AIREX in split squat alternating ball slams 3x10 each        Assessment:   The focus of the session was Strengthening, Balance, Motor Control, and Dynamic Stability Training. The pt demonstrated Good tolerance to the noted exercises today. The pt is demonstrated Good progress in skilled rehab at this time. The pt is still limited in overall Strength, ROM, Flexibility, Motor control, Balance, and Pain at this time. The pt continues to be a good candidate for skilled PT, in order to further improve Strength, ROM, Flexibility, Motor control, Balance, and Pain.     Education: continue HEP as prescibed    Plan:  Progress stability work and BFR for strength    Goals:  Active       PT Problem       PT Goal 1       Start:  08/29/24    Expected End:  10/24/24       By discharge, patient will:  Demonstrate independence with home exercise program  Tolerate increased exercise without adverse reaction  Demonstrate improved posture & proper body mechanics throughout session  Increase left ankle ROM to pain free + WNL  Increase left snkle strength to pain free + 5/5 throughout  Report decrease in pain by > 2 points to meet the MCID  Increase score of LEFS by > 9 points to meet the MCID  Ascend / descend stairs without an increase in symptoms  Ambulate x 1000' without an increase in symptoms  Pass return to sport battery               Lavelle Sandhu PT, DPT, OCS, C-OMPT, ITPT

## 2024-09-24 ENCOUNTER — TREATMENT (OUTPATIENT)
Dept: PHYSICAL THERAPY | Facility: HOSPITAL | Age: 24
End: 2024-09-24
Payer: COMMERCIAL

## 2024-09-24 DIAGNOSIS — S92.352D DISPLACED FRACTURE OF FIFTH METATARSAL BONE OF LEFT FOOT WITH ROUTINE HEALING: ICD-10-CM

## 2024-09-24 DIAGNOSIS — R29.898 LEFT LEG WEAKNESS: ICD-10-CM

## 2024-09-24 DIAGNOSIS — M25.672 ANKLE STIFFNESS, LEFT: ICD-10-CM

## 2024-09-24 DIAGNOSIS — Z47.89 ORTHOPEDIC AFTERCARE: ICD-10-CM

## 2024-09-24 DIAGNOSIS — R26.2 DIFFICULTY WALKING: ICD-10-CM

## 2024-09-24 PROCEDURE — 97112 NEUROMUSCULAR REEDUCATION: CPT | Mod: GP | Performed by: PHYSICAL THERAPIST

## 2024-09-24 PROCEDURE — 97016 VASOPNEUMATIC DEVICE THERAPY: CPT | Mod: GP | Performed by: PHYSICAL THERAPIST

## 2024-09-24 PROCEDURE — 97110 THERAPEUTIC EXERCISES: CPT | Mod: GP | Performed by: PHYSICAL THERAPIST

## 2024-09-24 ASSESSMENT — PAIN - FUNCTIONAL ASSESSMENT: PAIN_FUNCTIONAL_ASSESSMENT: 0-10

## 2024-09-24 ASSESSMENT — PAIN SCALES - GENERAL: PAINLEVEL_OUTOF10: 0 - NO PAIN

## 2024-09-24 NOTE — PROGRESS NOTES
"  Physical Therapy  Physical Therapy Treatment Note    Patient Name: Darci Eastman  MRN: [id number]  Today's Date: 9/24/2024  Time Calculation  Start Time: 0700  Stop Time: 0810  Time Calculation (min): 70 min    Insurance:  Visit number: 8 of [place]  Authorization info: no auth  Insurance Type: [facility]    Current Problem  1. Displaced fracture of fifth metatarsal bone of left foot with routine healing  Follow Up In Physical Therapy      2. Orthopedic aftercare  Follow Up In Physical Therapy      3. Ankle stiffness, left  Follow Up In Physical Therapy      4. Difficulty walking  Follow Up In Physical Therapy      5. Left leg weakness  Follow Up In Physical Therapy          Precautions: Precautions  LE Weight Bearing Status: Weight Bearing as Tolerated    General  Reason for Referral: s/p ORIF left fifth metatarsal fracture with calcaneus bone graft  Referred By: Dr. Jamshid Montgomery MD    Pain  Pain Assessment: 0-10  0-10 (Numeric) Pain Score: 0 - No pain    Subjective:   Patient reports he is doing well overall. Pt reports he still notices some pain over the dorsal aspect of the foot but loosens up as the day goes.      Performing HEP?: Yes      Objective:     Treatment Performed:  Therapeutic Exercise  Therapeutic Exercise Activity 1: upright bike x6 min (4min TABATA)  Therapeutic Exercise Activity 2: resisted side steeping green TB loop 5 yards x6  Therapeutic Exercise Activity 3: prostretch  Therapeutic Exercise Activity 4: quad hip rock for DF stretch    Balance/Neuromuscular Re-Education  Balance/Neuromuscular Re-Education Activity 1: slide board lateral slide 2x20  Balance/Neuromuscular Re-Education Activity 2: SL stance on half bolster 3x20\" each  Balance/Neuromuscular Re-Education Activity 3: split squat ball slams with heel elevated 10# 3x10  Balance/Neuromuscular Re-Education Activity 4: heel elevated land mine lunges retro/curtz 5x3  Balance/Neuromuscular Re-Education Activity 5: sports cord diagonal " steps 2x20  Balance/Neuromuscular Re-Education Activity 6: ladder walking in/out x4  Balance/Neuromuscular Re-Education Activity 7: fwd step up on bench with resisted inversion 3x12 each    Modalities  Modality 1: Untimed Vasopneumatic      Assessment:   The focus of the session was Strengthening, Balance, Motor Control, and Dynamic Stability Training. The pt demonstrated Good tolerance to the noted exercises today. The pt is demonstrated Good progress in skilled rehab at this time. The pt is still limited in overall Strength, ROM, Flexibility, Motor control, Balance, Gait mechanics, and Pain at this time. The pt continues to be a good candidate for skilled PT, in order to further improve Strength, ROM, Flexibility, Motor control, Balance, Gait mechanics, and Pain.     Education: updated HEP    Plan:  Possible alter-g    Goals:  Active       PT Problem       PT Goal 1       Start:  08/29/24    Expected End:  10/24/24       By discharge, patient will:  Demonstrate independence with home exercise program  Tolerate increased exercise without adverse reaction  Demonstrate improved posture & proper body mechanics throughout session  Increase left ankle ROM to pain free + WNL  Increase left snkle strength to pain free + 5/5 throughout  Report decrease in pain by > 2 points to meet the MCID  Increase score of LEFS by > 9 points to meet the MCID  Ascend / descend stairs without an increase in symptoms  Ambulate x 1000' without an increase in symptoms  Pass return to sport battery               Lavelle Sandhu PT, DPT, OCS, C-OMPT, ITPT

## 2024-09-26 ENCOUNTER — TREATMENT (OUTPATIENT)
Dept: PHYSICAL THERAPY | Facility: HOSPITAL | Age: 24
End: 2024-09-26
Payer: COMMERCIAL

## 2024-09-26 DIAGNOSIS — Z47.89 ORTHOPEDIC AFTERCARE: ICD-10-CM

## 2024-09-26 DIAGNOSIS — R29.898 LEFT LEG WEAKNESS: ICD-10-CM

## 2024-09-26 DIAGNOSIS — S92.352D DISPLACED FRACTURE OF FIFTH METATARSAL BONE OF LEFT FOOT WITH ROUTINE HEALING: ICD-10-CM

## 2024-09-26 DIAGNOSIS — M25.672 ANKLE STIFFNESS, LEFT: ICD-10-CM

## 2024-09-26 DIAGNOSIS — R26.2 DIFFICULTY WALKING: ICD-10-CM

## 2024-09-26 PROCEDURE — 97110 THERAPEUTIC EXERCISES: CPT | Mod: GP | Performed by: PHYSICAL THERAPIST

## 2024-09-26 PROCEDURE — 97112 NEUROMUSCULAR REEDUCATION: CPT | Mod: GP | Performed by: PHYSICAL THERAPIST

## 2024-09-26 PROCEDURE — 97016 VASOPNEUMATIC DEVICE THERAPY: CPT | Mod: GP | Performed by: PHYSICAL THERAPIST

## 2024-09-26 ASSESSMENT — PAIN - FUNCTIONAL ASSESSMENT: PAIN_FUNCTIONAL_ASSESSMENT: 0-10

## 2024-09-26 ASSESSMENT — PAIN SCALES - GENERAL: PAINLEVEL_OUTOF10: 0 - NO PAIN

## 2024-09-26 NOTE — PROGRESS NOTES
"  Physical Therapy  Physical Therapy Treatment Note    Patient Name: Darci Eastman  MRN: [id number]  Today's Date: 9/26/2024  Time Calculation  Start Time: 0645  Stop Time: 0800  Time Calculation (min): 75 min    Insurance:  Visit number: 9 of [place]  Authorization info: no auth  Insurance Type: [facility]       Current Problem  1. Displaced fracture of fifth metatarsal bone of left foot with routine healing  Follow Up In Physical Therapy      2. Orthopedic aftercare  Follow Up In Physical Therapy      3. Ankle stiffness, left  Follow Up In Physical Therapy      4. Difficulty walking  Follow Up In Physical Therapy      5. Left leg weakness  Follow Up In Physical Therapy          Precautions: Precautions  LE Weight Bearing Status: Weight Bearing as Tolerated    General  Reason for Referral: s/p ORIF left fifth metatarsal fracture with calcaneus bone graft  Referred By: Dr. Jamshid Montgomery MD      Pain  Pain Assessment: 0-10  0-10 (Numeric) Pain Score: 0 - No pain    Subjective:   Patient reports he is doing well overall. Pt states he has less dorsal foot pain.      Performing HEP?: Yes      Objective:       Treatment Performed:  Therapeutic Exercise  Therapeutic Exercise Activity 1: upright bike x6 min (4min TABATA)  Therapeutic Exercise Activity 2: resisted side steeping green TB loop 5 yards x6  Therapeutic Exercise Activity 3: prostretch  Therapeutic Exercise Activity 4: half kneeling calf stretch  Therapeutic Exercise Activity 5: BFR 80% SL heel raise total gym 30-15-15-15  Therapeutic Exercise Activity 6: BFR 80% ankle eversion black  30-15-15-15  Therapeutic Exercise Activity 7: BFR inversion black  30-15-15-15    Balance/Neuromuscular Re-Education  Balance/Neuromuscular Re-Education Activity 1: SL stance airex ball toss vs rebounder 11# 2x20  Balance/Neuromuscular Re-Education Activity 2: blaze pods BOSU squat 30\" x4  Balance/Neuromuscular Re-Education Activity 3: SL stance on BOSU KIESER glute grab 3x12 " each  Balance/Neuromuscular Re-Education Activity 4: rotational med ball slams vs wall 14# 3x12 each    Modalities  Modality 1: Untimed Vasopneumatic        Assessment:   The focus of the session was Strengthening, Balance, Motor Control, Dynamic Stability Training, and functional training. The pt demonstrated Good tolerance to the noted exercises today. The pt is demonstrated Good progress in skilled rehab at this time. The pt is still limited in overall Strength, Motor control, Balance, Gait mechanics, and Pain at this time. The pt continues to be a good candidate for skilled PT, in order to further improve Strength, ROM, Flexibility, Motor control, Balance, and Gait mechanics.     Education: progression of stability work    Plan:  Possible alter-g    Goals:  Active       PT Problem       PT Goal 1       Start:  08/29/24    Expected End:  10/24/24       By discharge, patient will:  Demonstrate independence with home exercise program  Tolerate increased exercise without adverse reaction  Demonstrate improved posture & proper body mechanics throughout session  Increase left ankle ROM to pain free + WNL  Increase left snkle strength to pain free + 5/5 throughout  Report decrease in pain by > 2 points to meet the MCID  Increase score of LEFS by > 9 points to meet the MCID  Ascend / descend stairs without an increase in symptoms  Ambulate x 1000' without an increase in symptoms  Pass return to sport battery               Lavelle Sandhu PT, DPT, OCS, C-OMPT, ITPT

## 2024-10-01 ENCOUNTER — TREATMENT (OUTPATIENT)
Dept: PHYSICAL THERAPY | Facility: HOSPITAL | Age: 24
End: 2024-10-01
Payer: COMMERCIAL

## 2024-10-01 DIAGNOSIS — Z47.89 ORTHOPEDIC AFTERCARE: ICD-10-CM

## 2024-10-01 DIAGNOSIS — R29.898 LEFT LEG WEAKNESS: ICD-10-CM

## 2024-10-01 DIAGNOSIS — M25.672 ANKLE STIFFNESS, LEFT: ICD-10-CM

## 2024-10-01 DIAGNOSIS — R26.2 DIFFICULTY WALKING: ICD-10-CM

## 2024-10-01 DIAGNOSIS — S92.352D DISPLACED FRACTURE OF FIFTH METATARSAL BONE OF LEFT FOOT WITH ROUTINE HEALING: ICD-10-CM

## 2024-10-01 PROCEDURE — 97110 THERAPEUTIC EXERCISES: CPT | Mod: GP | Performed by: PHYSICAL THERAPIST

## 2024-10-01 PROCEDURE — 97112 NEUROMUSCULAR REEDUCATION: CPT | Mod: GP | Performed by: PHYSICAL THERAPIST

## 2024-10-01 ASSESSMENT — PAIN - FUNCTIONAL ASSESSMENT: PAIN_FUNCTIONAL_ASSESSMENT: 0-10

## 2024-10-01 ASSESSMENT — PAIN SCALES - GENERAL: PAINLEVEL_OUTOF10: 0 - NO PAIN

## 2024-10-01 NOTE — PROGRESS NOTES
Physical Therapy  Physical Therapy Treatment Note    Patient Name: Darci Eastman  MRN: [id number]  Today's Date: 10/1/2024  Time Calculation  Start Time: 0700  Stop Time: 0800  Time Calculation (min): 60 min    Insurance:  Visit number: 10 of [place]  Authorization info: no auth  Insurance Type: [facility]    Current Problem  1. Displaced fracture of fifth metatarsal bone of left foot with routine healing  Follow Up In Physical Therapy      2. Orthopedic aftercare  Follow Up In Physical Therapy      3. Ankle stiffness, left  Follow Up In Physical Therapy      4. Difficulty walking  Follow Up In Physical Therapy      5. Left leg weakness  Follow Up In Physical Therapy          Precautions: Precautions  LE Weight Bearing Status: Weight Bearing as Tolerated    General  Reason for Referral: s/p ORIF left fifth metatarsal fracture with calcaneus bone graft  Referred By: Dr. Jamshid Montgomery MD      Pain  Pain Assessment: 0-10  0-10 (Numeric) Pain Score: 0 - No pain    Subjective:   Patient reports he is doing great overall. Pt states no pain and is feeling stronger      Performing HEP?: Yes      Objective:         Treatment Performed:  Therapeutic Exercise  Therapeutic Exercise Activity 1: upright bike x6 min (4min TABATA)  Therapeutic Exercise Activity 2: resisted side steeping green TB loop 5 yards x6  Therapeutic Exercise Activity 3: prostretch  Therapeutic Exercise Activity 4: half kneeling calf stretch  Therapeutic Exercise Activity 5: alter g walk jog 65% BW 1' walk 3mph 1' 5 mph x5 rounds    Balance/Neuromuscular Re-Education  Balance/Neuromuscular Re-Education Activity 1: blaze pods L lateral drill in cleats 50% effort  Balance/Neuromuscular Re-Education Activity 2: split stance on bench alternating ball slams on turf 3x12 each  Balance/Neuromuscular Re-Education Activity 3: single leg stance PB pertubations 3x each leg  Balance/Neuromuscular Re-Education Activity 4: single leg stance RDL ball drop 10#  3x12  Balance/Neuromuscular Re-Education Activity 5: Emeli SL elevated chops 8# 2x12 each leg each direction      Assessment:   The focus of the session was Strengthening, Balance, Motor Control, Dynamic Stability Training, Gait Training, and functional training. The pt demonstrated Good tolerance to the noted exercises today. The pt is demonstrated Good progress in skilled rehab at this time. The pt is still limited in overall Strength, ROM, Flexibility, Motor control, Balance, Gait mechanics, Effusion, and Pain at this time. The pt continues to be a good candidate for skilled PT, in order to further improve Strength, ROM, Flexibility, Motor control, Balance, Gait mechanics, Effusion, and Pain.     Education: assess foot pain post run    Plan:  Progress WB in alter-G    Goals:  Active       PT Problem       PT Goal 1       Start:  08/29/24    Expected End:  10/24/24       By discharge, patient will:  Demonstrate independence with home exercise program  Tolerate increased exercise without adverse reaction  Demonstrate improved posture & proper body mechanics throughout session  Increase left ankle ROM to pain free + WNL  Increase left snkle strength to pain free + 5/5 throughout  Report decrease in pain by > 2 points to meet the MCID  Increase score of LEFS by > 9 points to meet the MCID  Ascend / descend stairs without an increase in symptoms  Ambulate x 1000' without an increase in symptoms  Pass return to sport battery               Lavelle Sandhu PT, DPT, OCS, C-OMPT, ITPT

## 2024-10-03 ENCOUNTER — TREATMENT (OUTPATIENT)
Dept: PHYSICAL THERAPY | Facility: HOSPITAL | Age: 24
End: 2024-10-03
Payer: COMMERCIAL

## 2024-10-03 DIAGNOSIS — M25.672 ANKLE STIFFNESS, LEFT: ICD-10-CM

## 2024-10-03 DIAGNOSIS — S92.352D DISPLACED FRACTURE OF FIFTH METATARSAL BONE OF LEFT FOOT WITH ROUTINE HEALING: ICD-10-CM

## 2024-10-03 DIAGNOSIS — R29.898 LEFT LEG WEAKNESS: ICD-10-CM

## 2024-10-03 DIAGNOSIS — Z47.89 ORTHOPEDIC AFTERCARE: ICD-10-CM

## 2024-10-03 DIAGNOSIS — R26.2 DIFFICULTY WALKING: ICD-10-CM

## 2024-10-03 PROCEDURE — 97112 NEUROMUSCULAR REEDUCATION: CPT | Mod: GP | Performed by: PHYSICAL THERAPIST

## 2024-10-03 PROCEDURE — 97110 THERAPEUTIC EXERCISES: CPT | Mod: GP | Performed by: PHYSICAL THERAPIST

## 2024-10-03 PROCEDURE — 97016 VASOPNEUMATIC DEVICE THERAPY: CPT | Mod: GP | Performed by: PHYSICAL THERAPIST

## 2024-10-03 ASSESSMENT — PAIN - FUNCTIONAL ASSESSMENT: PAIN_FUNCTIONAL_ASSESSMENT: 0-10

## 2024-10-03 ASSESSMENT — PAIN SCALES - GENERAL: PAINLEVEL_OUTOF10: 0 - NO PAIN

## 2024-10-03 NOTE — PROGRESS NOTES
Physical Therapy  Physical Therapy Treatment Note    Patient Name: Darci Eastman  MRN: [id number]  Today's Date: 10/3/2024  Time Calculation  Start Time: 0645  Stop Time: 0800  Time Calculation (min): 75 min    Insurance:  Visit number: 11 of [place]  Authorization info: no auth  Insurance Type: [facility]    Current Problem  1. Displaced fracture of fifth metatarsal bone of left foot with routine healing  Follow Up In Physical Therapy      2. Orthopedic aftercare  Follow Up In Physical Therapy      3. Ankle stiffness, left  Follow Up In Physical Therapy      4. Difficulty walking  Follow Up In Physical Therapy      5. Left leg weakness  Follow Up In Physical Therapy          Precautions: Precautions  LE Weight Bearing Status: Weight Bearing as Tolerated    General  Reason for Referral: s/p ORIF left fifth metatarsal fracture with calcaneus bone graft DOS: 7/15/24  Referred By: Dr. Jamshid Montgomery MD  General Comment: POW: 11      Pain  Pain Assessment: 0-10  0-10 (Numeric) Pain Score: 0 - No pain    Subjective:   Patient reports he is doing well overall. Pt denied pain from alter-g      Performing HEP?: Yes      Objective:         Treatment Performed:  Therapeutic Exercise  Therapeutic Exercise Activity 1: upright bike x6 min (4min TABATA)  Therapeutic Exercise Activity 2: resisted side steeping green TB loop 5 yards x6  Therapeutic Exercise Activity 3: prostretch  Therapeutic Exercise Activity 4: half kneeling calf stretch  Therapeutic Exercise Activity 5: Alter-G walk to jog progression 75% BW 1' walk 3mph 1' 5 mph x17min  Therapeutic Exercise Activity 6: Rogue sled push/pull 3x15 yrd 25#     Balance/Neuromuscular Re-Education  Balance/Neuromuscular Re-Education Activity 1: Agility ladder 50% effort x2 each pattern  Balance/Neuromuscular Re-Education Activity 2: SL balance on BOSU with frisbee toss 3x10 throws     Modalities  Modality 1: Untimed Vasopneumatic (34 degrees, high compression, 15 min,  bilaterally)    Assessment:   The focus of the session was Strengthening, ROM, Balance, Motor Control, and functional training. The pt demonstrated Good tolerance to the noted exercises today. The pt is demonstrated Good progress in skilled rehab at this time. The pt is still limited in overall Strength, ROM, Flexibility, Motor control, Balance, Gait mechanics, and Pain at this time. The pt continues to be a good candidate for skilled PT, in order to further improve Strength, ROM, Motor control, Balance, and Pain.     Education: continue with HEP as prescribed    Plan:  Progress alter-g    Goals:  Active       PT Problem       PT Goal 1       Start:  08/29/24    Expected End:  10/24/24       By discharge, patient will:  Demonstrate independence with home exercise program  Tolerate increased exercise without adverse reaction  Demonstrate improved posture & proper body mechanics throughout session  Increase left ankle ROM to pain free + WNL  Increase left snkle strength to pain free + 5/5 throughout  Report decrease in pain by > 2 points to meet the MCID  Increase score of LEFS by > 9 points to meet the MCID  Ascend / descend stairs without an increase in symptoms  Ambulate x 1000' without an increase in symptoms  Pass return to sport battery             Clari Ray, MS, AT, ATC       Lavelle Sandhu PT, DPT, OCS, C-OMPT, ITPT

## 2024-10-04 ENCOUNTER — TELEPHONE (OUTPATIENT)
Dept: ORTHOPEDIC SURGERY | Facility: CLINIC | Age: 24
End: 2024-10-04
Payer: COMMERCIAL

## 2024-10-04 ENCOUNTER — HOSPITAL ENCOUNTER (OUTPATIENT)
Dept: RADIOLOGY | Facility: HOSPITAL | Age: 24
Discharge: HOME | End: 2024-10-04
Payer: COMMERCIAL

## 2024-10-04 DIAGNOSIS — S92.352A CLOSED FRACTURE OF BASE OF FIFTH METATARSAL BONE OF LEFT FOOT, INITIAL ENCOUNTER: ICD-10-CM

## 2024-10-04 DIAGNOSIS — S92.352A CLOSED FRACTURE OF BASE OF FIFTH METATARSAL BONE OF LEFT FOOT, INITIAL ENCOUNTER: Primary | ICD-10-CM

## 2024-10-04 PROCEDURE — 73630 X-RAY EXAM OF FOOT: CPT | Mod: LT

## 2024-10-04 NOTE — TELEPHONE ENCOUNTER
I contacted the patient via the number listed in the chart.  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  He has been progressing with the Alter G without pain.  I will obtain x-ray of his left foot in anticipation of ordering a CT scan of his left foot to evaluate for healing prior to return to formal impact activity and sport in the setting of this complicated stress fracture.      Jamshid Montgomery MD, LAURA  Department of Orthopaedic Surgery  [facility]

## 2024-10-05 ENCOUNTER — PATIENT MESSAGE (OUTPATIENT)
Dept: ORTHOPEDIC SURGERY | Facility: CLINIC | Age: 24
End: 2024-10-05
Payer: COMMERCIAL

## 2024-10-05 DIAGNOSIS — S92.352A CLOSED FRACTURE OF BASE OF FIFTH METATARSAL BONE OF LEFT FOOT, INITIAL ENCOUNTER: Primary | ICD-10-CM

## 2024-10-08 ENCOUNTER — OFFICE VISIT (OUTPATIENT)
Dept: ORTHOPEDIC SURGERY | Facility: CLINIC | Age: 24
End: 2024-10-08
Payer: COMMERCIAL

## 2024-10-08 ENCOUNTER — TREATMENT (OUTPATIENT)
Dept: PHYSICAL THERAPY | Facility: HOSPITAL | Age: 24
End: 2024-10-08
Payer: COMMERCIAL

## 2024-10-08 ENCOUNTER — TELEPHONE (OUTPATIENT)
Dept: ORTHOPEDIC SURGERY | Facility: HOSPITAL | Age: 24
End: 2024-10-08

## 2024-10-08 ENCOUNTER — HOSPITAL ENCOUNTER (OUTPATIENT)
Dept: RADIOLOGY | Facility: HOSPITAL | Age: 24
Discharge: HOME | End: 2024-10-08
Payer: COMMERCIAL

## 2024-10-08 DIAGNOSIS — Z47.89 ORTHOPEDIC AFTERCARE: ICD-10-CM

## 2024-10-08 DIAGNOSIS — R29.898 LEFT LEG WEAKNESS: ICD-10-CM

## 2024-10-08 DIAGNOSIS — S92.352A CLOSED FRACTURE OF BASE OF FIFTH METATARSAL BONE OF LEFT FOOT, INITIAL ENCOUNTER: ICD-10-CM

## 2024-10-08 DIAGNOSIS — R26.2 DIFFICULTY WALKING: ICD-10-CM

## 2024-10-08 DIAGNOSIS — S92.352D DISPLACED FRACTURE OF FIFTH METATARSAL BONE OF LEFT FOOT WITH ROUTINE HEALING: ICD-10-CM

## 2024-10-08 DIAGNOSIS — M25.672 ANKLE STIFFNESS, LEFT: ICD-10-CM

## 2024-10-08 PROCEDURE — 97016 VASOPNEUMATIC DEVICE THERAPY: CPT | Mod: GP | Performed by: PHYSICAL THERAPIST

## 2024-10-08 PROCEDURE — 97110 THERAPEUTIC EXERCISES: CPT | Mod: GP | Performed by: PHYSICAL THERAPIST

## 2024-10-08 PROCEDURE — 73700 CT LOWER EXTREMITY W/O DYE: CPT | Mod: LEFT SIDE | Performed by: STUDENT IN AN ORGANIZED HEALTH CARE EDUCATION/TRAINING PROGRAM

## 2024-10-08 PROCEDURE — 99211 OFF/OP EST MAY X REQ PHY/QHP: CPT | Performed by: STUDENT IN AN ORGANIZED HEALTH CARE EDUCATION/TRAINING PROGRAM

## 2024-10-08 PROCEDURE — 73700 CT LOWER EXTREMITY W/O DYE: CPT | Mod: LT

## 2024-10-08 ASSESSMENT — PAIN SCALES - GENERAL
PAINLEVEL_OUTOF10: 0 - NO PAIN

## 2024-10-08 ASSESSMENT — PAIN - FUNCTIONAL ASSESSMENT
PAIN_FUNCTIONAL_ASSESSMENT: 0-10

## 2024-10-08 NOTE — TELEPHONE ENCOUNTER
I contacted the patient via the number listed in the chart, he has completed his CT scan of his left foot.  There appears to be near complete healing at this point.  I would recommend the patient continue his return to sport program would release him to continue with impact activity.  He is intending to wear a carbon fiber cleat which I would agree with at this point. I discussed that he certainly should look out for abnormal pain and he will keep me apprised of his progress.   I will otherwise plan to see the patient back follow-up.    Jamshid Montgomery MD, LAURA  Department of Orthopaedic Surgery  [facility]

## 2024-10-08 NOTE — PROGRESS NOTES
"  Physical Therapy  Physical Therapy Treatment Note    Patient Name: Darci Eastman  MRN: [id number]  Today's Date: 10/8/2024  Time Calculation  Start Time: 0650  Stop Time: 0800  Time Calculation (min): 70 min    Insurance:  Visit number: 12 of [place]  Authorization info: no auth  Insurance Type: [facility]    Current Problem  1. Displaced fracture of fifth metatarsal bone of left foot with routine healing  Follow Up In Physical Therapy      2. Orthopedic aftercare  Follow Up In Physical Therapy      3. Ankle stiffness, left  Follow Up In Physical Therapy      4. Difficulty walking  Follow Up In Physical Therapy      5. Left leg weakness  Follow Up In Physical Therapy            General  Reason for Referral: s/p ORIF left fifth metatarsal fracture with calcaneus bone graft DOS: 7/15/24  Referred By: Dr. Jamshid Montgomery MD  General Comment: POW: 11        Pain  Pain Assessment: 0-10  0-10 (Numeric) Pain Score: 0 - No pain    Subjective:   Patient reports he is feeling well overall. Pt recently had x-ray which he will review with Dr. Montgomery today. 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       Performing HEP?: Yes      Objective:     Treatment Performed:  Therapeutic Exercise  Therapeutic Exercise Activity 1: upright bike x6 min (4min TABATA)  Therapeutic Exercise Activity 2: resisted side steeping green TB loop 5 yards x6  Therapeutic Exercise Activity 3: prostretch  Therapeutic Exercise Activity 4: half kneeling calf stretch  Therapeutic Exercise Activity 5: Alter-G walk to jog progression 90% BW 1' walk 3mph 2' 5 mph x12min  Therapeutic Exercise Activity 6: total gym seat zero no cords DL hops 3x20  Therapeutic Exercise Activity 7: total gym seat zero no cords akternating hops 3x20  Therapeutic Exercise Activity 8: total gym single leg hops seat zero 3x10  Therapeutic Exercise Activity 9: lateral heel taps 12\" box 3x12 each  Therapeutic Exercise Activity 10: fwd heel taps 6\" " box 3x12  Therapeutic Exercise Activity 11: ladder drills no impact  Therapeutic Exercise Activity 12: posterior tib rasies with ball    Modalities  Modality 1: Untimed Vasopneumatic      Assessment:   The focus of the session was Strengthening, Motor Control, and Dynamic Stability Training. The pt demonstrated Good tolerance to the noted exercises today. The pt is demonstrated Good progress in skilled rehab at this time. The pt is still limited in overall Strength, ROM, Flexibility, Motor control, Balance, Gait mechanics, and Pain at this time. The pt continues to be a good candidate for skilled PT, in order to further improve Strength, ROM, Flexibility, Motor control, Balance, Gait mechanics, and Pain.     Education: monitor soreness    Plan:  Progress functional activities as tolerated    Goals:  Active       PT Problem       PT Goal 1       Start:  08/29/24    Expected End:  10/24/24       By discharge, patient will:  Demonstrate independence with home exercise program  Tolerate increased exercise without adverse reaction  Demonstrate improved posture & proper body mechanics throughout session  Increase left ankle ROM to pain free + WNL  Increase left snkle strength to pain free + 5/5 throughout  Report decrease in pain by > 2 points to meet the MCID  Increase score of LEFS by > 9 points to meet the MCID  Ascend / descend stairs without an increase in symptoms  Ambulate x 1000' without an increase in symptoms  Pass return to sport battery               Lavelle Sandhu PT, DPT, OCS, C-OMPT, ITPT

## 2024-10-08 NOTE — PROGRESS NOTES
ORTHOPAEDIC SURGERY PROGRESS NOTE    Chief Complaint:  Left foot pain    History Of Present Illness  Darci Eastman is a 24 y.o. male who presents for evaluation of left foot pain as a referral from Dr. Chan.  Patient initially sustained an injury to his left foot and October 2023.  Portions of the history are provided both by the patient as well as EMR and review of care everywhere.  This was treated conservatively.  Patient was later seen in January 2024 with mild persistent pain.  He was then seen on 06/17/2024 for acute on chronic left foot pain.  He sustained a awkward landing while playing ultimate Frisbee from 06/14/2024.  He actually thinks he was going up for the [employer] when he noticed pain.  He was seen in an urgent care setting time and made nonweightbearing in his left lower extremity in a fracture boot.    Patient was intermittently treated for presumed peroneal tendinitis after attending a walk-in therapy student clinic at [employer].  He has been able to continue playing but has noticed a nagging pain.  He has been able to play through the pain.    He currently reports minimal with pain nonweightbearing.  He has been compliant with nonweightbearing restrictions in a tall walking boot.  He has had a recent MRI and is here for review.    Patient does not use tobacco products.  He has recently graduated and is on the job hunt.    07/31/2024: Patient returns s/p ORIF left fifth metatarsal fracture with calcaneus bone graft from 07/15/2024.  Patient has been compliant with nonweightbearing restrictions utilizing a knee scooter.  He has been on aspirin for DVT prophylaxis.  He has had noted improvement in his right foot pain from prior to surgery.  He has been mobilizing his right foot.  He is not reporting any numbness in his left foot.  He is maintained on aspirin for DVT prophylaxis and VitD/Ca.    08/13/2024: Patient returns s/p ORIF left fifth metatarsal fracture with calcaneus bone graft from  07/15/2024.  Patient is reporting 3 out of 10 pain in the left foot, that is gradually improving.  He has been compliant with nonweightbearing restrictions utilizing a knee scooter.  He has been on aspirin for DVT prophylaxis.  He notes continued improvement with respect to his right foot pain.  He denies numbness, tingling or weakness in the LLE.    09/10/2024: Patient returns s/p ORIF left fifth metatarsal fracture with calcaneus bone graft from 07/15/2024.  He is currently reporting 2 out of 10 pain.  He has been in physical therapy.  He has fully transitioned out of the walking boot, he continues to note some pain particularly with lateral movement.  He is comfortable walking but does not yet fully trust his foot for support.    10/08/2024: Patient returns s/p ORIF left fifth metatarsal fracture with calcaneus bone graft from 07/15/2024.  He is currently reporting no pain.  He has continued in physical therapy and has been steadily increasing his impact activity with the [employer] apparatus.  He believes the majority of his foot pain is related to soft tissue discomfort and he has been steadily improving.  He is anticipating tryouts in the beginning of November and does not yet fully trust his foot.     Past Medical History  Past Medical History:   Diagnosis Date    Closed fracture of base of fifth metatarsal bone of left foot, initial encounter        Surgical History  Recent Surgeries in Orthopaedic Surgery            No cases to display             Social History  Social History     Socioeconomic History    Marital status: Single   Tobacco Use    Smoking status: Never    Smokeless tobacco: Never   Vaping Use    Vaping status: Never Used   Substance and Sexual Activity    Alcohol use: Yes     Alcohol/week: 6.0 standard drinks of alcohol     Types: 6 Standard drinks or equivalent per week    Drug use: Yes     Types: Marijuana     Comment: last use April - no other drugs    Sexual activity: Defer       Family  History  Family History   Problem Relation Name Age of Onset    ALS Mother      Hypertension Father      No Known Problems Sister      No Known Problems Brother          Allergies  No Known Allergies    Review of Systems  REVIEW OF SYSTEMS  Constitutional: no unplanned weight loss  Psychiatric: no suicidal ideation  ENT: no vision changes, no sinus problems  Pulmonary: no shortness of breath  Lymphatic: no enlarged lymph nodes  Cardiovascular: no chest pain or shortness of breath  Gastrointestinal: no stomach problems  Genitourinary: no dysuria   Skin: no rashes  Endocrine: no thyroid problems  Neurological: no headache, no numbness  Hematological: no easy bruising  Musculoskeletal: Left foot pain    Physical Exam  PHYSICAL EXAMINATION  Constitutional Exam: well developed and well nourished  Psychiatric Exam: alert and oriented, appropriate mood and behavior  Eye Exam: EOMI  Pulmonary Exam: breathing non-labored, no apparent distress  Lymphatic exam: no appreciable lymphadenopathy in the lower extremities  Cardiovascular exam: RRR to peripheral palpation, DP pulses 2+, PT 2+, toes are pink with good capillary refill, no pitting edema  Skin exam: no open lesions, rashes, abrasions or ulcerations  Neurological exam: sensation to light touch intact in both lower extremities in peripheral and dermatomal distributions (except for any abnormalities noted in musculoskeletal exam)    Musculoskeletal exam: Left lower extremity examination.  Patient calcaneal and lateral fifth metatarsal incisions well-healed.  He is nontender to palpation at the fifth metatarsal.  He has no pain with attempted midfoot stress. Patient has pain-free and supple ankle, subtalar midtarsal joint range of motion.  Patient has sensation intact to light touch grossly in a saphenous, sural, superficial peroneal, deep peroneal and tibial nerve distribution. He has intact PF/DF/EHL. 2+ Dp/pt pulses.    Last Recorded Vitals  There were no vitals taken  for this visit.    Laboratory Results    No results found for this or any previous visit (from the past 24 hour(s)).    Radiology Results   X-ray imaging 3 view weightbearing left foot reviewed from 10/08/2024 and independently evaluated by me demonstrates no acute fracture or dislocation.  There is maintained position and alignment following the plantar plating of the fifth metatarsal fracture with appropriate screw length and trajectory.  There is no obvious.  Plan fracture, lucency or loosening.    Assessment/Plan:  24-year-old male s/p ORIF left fifth metatarsal fracture with calcaneus bone graft from 07/15/2024 who presents with both clinical and radiographic evidence of healing.  I would recommend the patient continue weightbearing to his tolerance in his left lower extremity.  I will order a stat CT scan of his left foot to evaluate for osseous bridging as he is anticipating returning to impact and high-level athletics.  I discussed provisionally if there were to be insufficient healing that we would have to consider delaying his return to sport with the possibility of bone stimulator or return to the operating room.  I will plan to contact the patient following completion of his CT scan for discussion regarding imaging results.  Upon return, patient will require three-view weightbearing left foot.    Jamshid Montgomery MD, LAURA  Department of Orthopaedic Surgery  [facility]    The diagnosis and treatment plan were reviewed with the patient. All questions were answered. The patient verbalized understanding of the treatment plan. There were no barriers to understanding identified.    Note dictated with [employer] software.  Completed without full type editing and sent to avoid delay.

## 2024-10-10 ENCOUNTER — TREATMENT (OUTPATIENT)
Dept: PHYSICAL THERAPY | Facility: HOSPITAL | Age: 24
End: 2024-10-10
Payer: COMMERCIAL

## 2024-10-10 ENCOUNTER — APPOINTMENT (OUTPATIENT)
Dept: ORTHOPEDIC SURGERY | Facility: CLINIC | Age: 24
End: 2024-10-10
Payer: COMMERCIAL

## 2024-10-10 DIAGNOSIS — R29.898 LEFT LEG WEAKNESS: ICD-10-CM

## 2024-10-10 DIAGNOSIS — R26.2 DIFFICULTY WALKING: ICD-10-CM

## 2024-10-10 DIAGNOSIS — M25.672 ANKLE STIFFNESS, LEFT: ICD-10-CM

## 2024-10-10 DIAGNOSIS — S92.352D DISPLACED FRACTURE OF FIFTH METATARSAL BONE OF LEFT FOOT WITH ROUTINE HEALING: ICD-10-CM

## 2024-10-10 DIAGNOSIS — Z47.89 ORTHOPEDIC AFTERCARE: ICD-10-CM

## 2024-10-10 PROCEDURE — 97110 THERAPEUTIC EXERCISES: CPT | Mod: GP | Performed by: PHYSICAL THERAPIST

## 2024-10-10 PROCEDURE — 97016 VASOPNEUMATIC DEVICE THERAPY: CPT | Mod: GP | Performed by: PHYSICAL THERAPIST

## 2024-10-10 ASSESSMENT — PAIN - FUNCTIONAL ASSESSMENT: PAIN_FUNCTIONAL_ASSESSMENT: 0-10

## 2024-10-10 ASSESSMENT — PAIN SCALES - GENERAL: PAINLEVEL_OUTOF10: 0 - NO PAIN

## 2024-10-10 NOTE — PROGRESS NOTES
"  Physical Therapy  Physical Therapy Treatment Note    Patient Name: Darci Eastman  MRN: [id number]  Today's Date: 10/10/2024       Insurance:  Visit number: [place]  Authorization info: no auth  Insurance Type: [facility]       Current Problem  1. Displaced fracture of fifth metatarsal bone of left foot with routine healing  Follow Up In Physical Therapy      2. Orthopedic aftercare  Follow Up In Physical Therapy      3. Ankle stiffness, left  Follow Up In Physical Therapy      4. Difficulty walking  Follow Up In Physical Therapy      5. Left leg weakness  Follow Up In Physical Therapy          General  Reason for Referral: s/p ORIF left fifth metatarsal fracture with calcaneus bone graft DOS: 7/15/24  Referred By: Dr. Jamshid Montgomery MD  General Comment: 16 weeks        Pain  Pain Assessment: 0-10  0-10 (Numeric) Pain Score: 0 - No pain    Subjective:   Patient reports he is doing really well today. Pt had CT and follow up with Dr. Montgomery that showed good healing. Pt cleared for impacts as tolerated      Performing HEP?: Yes      Objective:         Treatment Performed:  Therapeutic Exercise  Therapeutic Exercise Activity 1: upright bike x6 min (4min TABATA)  Therapeutic Exercise Activity 2: resisted side steeping green TB loop 5 yards x6  Therapeutic Exercise Activity 3: prostretch  Therapeutic Exercise Activity 4: half kneeling calf stretch  Therapeutic Exercise Activity 6: total gym seat zero no cords DL hops 3x20  Therapeutic Exercise Activity 7: total gym seat zero no cords akternating hops 3x20  Therapeutic Exercise Activity 8: total gym single leg hops seat zero 3x10  Therapeutic Exercise Activity 9: 10 yard jog x4  Therapeutic Exercise Activity 10: 10 yard side shuffle    Balance/Neuromuscular Re-Education  Balance/Neuromuscular Re-Education Activity 1: fwd decels x20 total  Balance/Neuromuscular Re-Education Activity 2: BL box jumps 12\" box 3x12  Balance/Neuromuscular Re-Education Activity 3: BL box " "jumps with single leg landing 12\" box 3x12  Balance/Neuromuscular Re-Education Activity 4: cone box drill  Balance/Neuromuscular Re-Education Activity 5: sports cord lateral hops  Balance/Neuromuscular Re-Education Activity 6: fisbee drill    Modalities  Modality 1: Untimed Vasopneumatic      Assessment:   The focus of the session was Strengthening, ROM, Stretching, Motor Control, Dynamic Stability Training, and functional training. The pt demonstrated Good tolerance to the noted exercises today. The pt is demonstrated Good progress in skilled rehab at this time. The pt is still limited in overall Strength, ROM, Flexibility, Motor control, Balance, Gait mechanics, and Pain at this time. The pt continues to be a good candidate for skilled PT, in order to further improve Strength, ROM, Flexibility, Motor control, Balance, Gait mechanics, and Pain.     Education: updated HEP    Plan:  Progress sports specific activities as tolerated    Goals:  Active       PT Problem       PT Goal 1       Start:  08/29/24    Expected End:  10/24/24       By discharge, patient will:  Demonstrate independence with home exercise program  Tolerate increased exercise without adverse reaction  Demonstrate improved posture & proper body mechanics throughout session  Increase left ankle ROM to pain free + WNL  Increase left snkle strength to pain free + 5/5 throughout  Report decrease in pain by > 2 points to meet the MCID  Increase score of LEFS by > 9 points to meet the MCID  Ascend / descend stairs without an increase in symptoms  Ambulate x 1000' without an increase in symptoms  Pass return to sport battery               Lavelle Sandhu PT, DPT, OCS, C-OMPT, ITPT  "

## 2024-10-15 ENCOUNTER — TREATMENT (OUTPATIENT)
Dept: PHYSICAL THERAPY | Facility: HOSPITAL | Age: 24
End: 2024-10-15
Payer: COMMERCIAL

## 2024-10-15 DIAGNOSIS — S92.352D DISPLACED FRACTURE OF FIFTH METATARSAL BONE OF LEFT FOOT WITH ROUTINE HEALING: ICD-10-CM

## 2024-10-15 DIAGNOSIS — R26.2 DIFFICULTY WALKING: ICD-10-CM

## 2024-10-15 DIAGNOSIS — Z47.89 ORTHOPEDIC AFTERCARE: ICD-10-CM

## 2024-10-15 DIAGNOSIS — M25.672 ANKLE STIFFNESS, LEFT: ICD-10-CM

## 2024-10-15 DIAGNOSIS — R29.898 LEFT LEG WEAKNESS: ICD-10-CM

## 2024-10-15 PROCEDURE — 97112 NEUROMUSCULAR REEDUCATION: CPT | Mod: GP | Performed by: PHYSICAL THERAPIST

## 2024-10-15 PROCEDURE — 97110 THERAPEUTIC EXERCISES: CPT | Mod: GP | Performed by: PHYSICAL THERAPIST

## 2024-10-15 PROCEDURE — 97016 VASOPNEUMATIC DEVICE THERAPY: CPT | Mod: GP | Performed by: PHYSICAL THERAPIST

## 2024-10-15 ASSESSMENT — PAIN - FUNCTIONAL ASSESSMENT: PAIN_FUNCTIONAL_ASSESSMENT: 0-10

## 2024-10-15 ASSESSMENT — PAIN SCALES - GENERAL: PAINLEVEL_OUTOF10: 0 - NO PAIN

## 2024-10-15 NOTE — PROGRESS NOTES
Physical Therapy  Physical Therapy Treatment Note    Patient Name: Darci Eastman  MRN: [id number]  Today's Date: 10/15/2024  Time Calculation  Start Time: 0700  Stop Time: 0810  Time Calculation (min): 70 min    Insurance:  Visit number: 14 of [place]  Authorization info: no auth  Insurance Type: [facility]    Current Problem  1. Displaced fracture of fifth metatarsal bone of left foot with routine healing  Follow Up In Physical Therapy      2. Orthopedic aftercare  Follow Up In Physical Therapy      3. Ankle stiffness, left  Follow Up In Physical Therapy      4. Difficulty walking  Follow Up In Physical Therapy      5. Left leg weakness  Follow Up In Physical Therapy          Precautions:      General  Reason for Referral: s/p ORIF left fifth metatarsal fracture with calcaneus bone graft DOS: 7/15/24  Referred By: Dr. Jamshid Montgomery MD  General Comment: 17 weeks        Pain  Pain Assessment: 0-10  0-10 (Numeric) Pain Score: 0 - No pain    Subjective:   Patient reports he is doing really well overall. Pt states he was able to participate in ultaFrisbee activities with little issues      Performing HEP?: Yes      Objective:         Treatment Performed:  Therapeutic Exercise  Therapeutic Exercise Activity 1: upright bike x6 min (4min TABATA)  Therapeutic Exercise Activity 2: resisted side steeping green TB loop 5 yards x6  Therapeutic Exercise Activity 3: prostretch  Therapeutic Exercise Activity 4: half kneeling calf stretch  Therapeutic Exercise Activity 6: total gym seat zero no cords DL hops 3x20  Therapeutic Exercise Activity 7: total gym seat zero no cords akternating hops 3x20  Therapeutic Exercise Activity 8: total gym single leg hops seat zero 3x10  Therapeutic Exercise Activity 9: 10 yard jog x4  Therapeutic Exercise Activity 10: 10 yard side shuffle  Therapeutic Exercise Activity 11: fwd sprinting build ups    Balance/Neuromuscular Re-Education  Balance/Neuromuscular Re-Education Activity 1: fwd decels x20  total  Balance/Neuromuscular Re-Education Activity 2: blaze pod agility drill    Modalities  Modality 1: Untimed Vasopneumatic      Assessment:   The focus of the session was Strengthening, Motor Control, Dynamic Stability Training, and functional training. The pt demonstrated Good tolerance to the noted exercises today. The pt is demonstrated Good progress in skilled rehab at this time. The pt is still limited in overall Strength, Motor control, Balance, and Pain at this time. The pt continues to be a good candidate for skilled PT, in order to further improve Strength, ROM, Motor control, and Pain.     Education: load management    Plan:  Continue to progress sports specific activities as tolerated    Goals:  Active       PT Problem       PT Goal 1       Start:  08/29/24    Expected End:  10/24/24       By discharge, patient will:  Demonstrate independence with home exercise program  Tolerate increased exercise without adverse reaction  Demonstrate improved posture & proper body mechanics throughout session  Increase left ankle ROM to pain free + WNL  Increase left snkle strength to pain free + 5/5 throughout  Report decrease in pain by > 2 points to meet the MCID  Increase score of LEFS by > 9 points to meet the MCID  Ascend / descend stairs without an increase in symptoms  Ambulate x 1000' without an increase in symptoms  Pass return to sport battery               Lavelle Sandhu PT, DPT, OCS, C-OMPT, ITPT

## 2024-10-17 ENCOUNTER — TREATMENT (OUTPATIENT)
Dept: PHYSICAL THERAPY | Facility: HOSPITAL | Age: 24
End: 2024-10-17
Payer: COMMERCIAL

## 2024-10-17 DIAGNOSIS — S92.352D DISPLACED FRACTURE OF FIFTH METATARSAL BONE OF LEFT FOOT WITH ROUTINE HEALING: ICD-10-CM

## 2024-10-17 DIAGNOSIS — R26.2 DIFFICULTY WALKING: ICD-10-CM

## 2024-10-17 DIAGNOSIS — M25.672 ANKLE STIFFNESS, LEFT: ICD-10-CM

## 2024-10-17 DIAGNOSIS — Z47.89 ORTHOPEDIC AFTERCARE: ICD-10-CM

## 2024-10-17 DIAGNOSIS — R29.898 LEFT LEG WEAKNESS: ICD-10-CM

## 2024-10-17 PROCEDURE — 97140 MANUAL THERAPY 1/> REGIONS: CPT | Mod: GP | Performed by: PHYSICAL THERAPIST

## 2024-10-17 PROCEDURE — 97110 THERAPEUTIC EXERCISES: CPT | Mod: GP | Performed by: PHYSICAL THERAPIST

## 2024-10-17 PROCEDURE — 97530 THERAPEUTIC ACTIVITIES: CPT | Mod: GP | Performed by: PHYSICAL THERAPIST

## 2024-10-17 PROCEDURE — 97016 VASOPNEUMATIC DEVICE THERAPY: CPT | Mod: GP | Performed by: PHYSICAL THERAPIST

## 2024-10-17 ASSESSMENT — PAIN - FUNCTIONAL ASSESSMENT: PAIN_FUNCTIONAL_ASSESSMENT: 0-10

## 2024-10-17 ASSESSMENT — PAIN SCALES - GENERAL: PAINLEVEL_OUTOF10: 0 - NO PAIN

## 2024-10-17 NOTE — PROGRESS NOTES
"  Physical Therapy  Physical Therapy Treatment Note    Patient Name: Darci Eastman  MRN: [id number]  Today's Date: 10/17/2024  Time Calculation  Start Time: 0700  Stop Time: 0810  Time Calculation (min): 70 min    Insurance:  Visit number: 15 of [place]  Authorization info: no auth  Insurance Type: [facility]    Current Problem  1. Displaced fracture of fifth metatarsal bone of left foot with routine healing  Follow Up In Physical Therapy      2. Orthopedic aftercare  Follow Up In Physical Therapy      3. Ankle stiffness, left  Follow Up In Physical Therapy      4. Difficulty walking  Follow Up In Physical Therapy      5. Left leg weakness  Follow Up In Physical Therapy          Precautions:      General  Reason for Referral: s/p ORIF left fifth metatarsal fracture with calcaneus bone graft DOS: 7/15/24  Referred By: Dr. Jamshid Montgomery MD  General Comment: 18 weeks        Pain  Pain Assessment: 0-10  0-10 (Numeric) Pain Score: 0 - No pain    Subjective:   Patient reports he is feeling really well. Pt denies any new issues.      Performing HEP?: Yes      Objective:         Treatment Performed:  Therapeutic Exercise  Therapeutic Exercise Activity 1: upright bike x6 min (4min TABATA)  Therapeutic Exercise Activity 2: resisted side steeping green TB loop 5 yards x6  Therapeutic Exercise Activity 3: prostretch  Therapeutic Exercise Activity 4: half kneeling calf stretch  Therapeutic Exercise Activity 5: total gym jumps seat 5 1 cord BL x20  Therapeutic Exercise Activity 6: total gym single leg jump seat 5 2x10  Therapeutic Exercise Activity 7: fwd jog bulid ups    Therapeutic Activity  Therapeutic Activity 1: step off into vertical jump 20\" box 2x12  Therapeutic Activity 2: step off into from 20\" box into single leg landing on 6\" box 3x12  Therapeutic Activity 3: cone cutting drills  Therapeutic Activity 4: frisbee locating drill         Manual Therapy  Manual Therapy Activity 1: STM peroneals, anterior tib, post tib, " plantar surface of foot    Modalities  Modality 1: Untimed Vasopneumatic        Assessment:   The focus of the session was Strengthening, Dynamic Stability Training, and functional training. The pt demonstrated Good tolerance to the noted exercises today. The pt is demonstrated Good progress in skilled rehab at this time. The pt is still limited in overall Strength, Motor control, and Pain at this time. The pt continues to be a good candidate for skilled PT, in order to further improve Strength, Motor control, and Pain.     Education: continue to progress as tolerated at [facility]    Plan:  Progress sports specific activities as tolerated    Goals:  Active       PT Problem       PT Goal 1       Start:  08/29/24    Expected End:  10/24/24       By discharge, patient will:  Demonstrate independence with home exercise program  Tolerate increased exercise without adverse reaction  Demonstrate improved posture & proper body mechanics throughout session  Increase left ankle ROM to pain free + WNL  Increase left snkle strength to pain free + 5/5 throughout  Report decrease in pain by > 2 points to meet the MCID  Increase score of LEFS by > 9 points to meet the MCID  Ascend / descend stairs without an increase in symptoms  Ambulate x 1000' without an increase in symptoms  Pass return to sport battery               Lavelle Sandhu PT, DPT, OCS, C-OMPT, ITPT

## 2024-10-21 ENCOUNTER — APPOINTMENT (OUTPATIENT)
Dept: RADIOLOGY | Facility: HOSPITAL | Age: 24
End: 2024-10-21
Payer: COMMERCIAL

## 2024-10-22 ENCOUNTER — TREATMENT (OUTPATIENT)
Dept: PHYSICAL THERAPY | Facility: HOSPITAL | Age: 24
End: 2024-10-22
Payer: COMMERCIAL

## 2024-10-22 DIAGNOSIS — R26.2 DIFFICULTY WALKING: ICD-10-CM

## 2024-10-22 DIAGNOSIS — S92.352D DISPLACED FRACTURE OF FIFTH METATARSAL BONE OF LEFT FOOT WITH ROUTINE HEALING: ICD-10-CM

## 2024-10-22 DIAGNOSIS — Z47.89 ORTHOPEDIC AFTERCARE: ICD-10-CM

## 2024-10-22 DIAGNOSIS — M25.672 ANKLE STIFFNESS, LEFT: ICD-10-CM

## 2024-10-22 DIAGNOSIS — R29.898 LEFT LEG WEAKNESS: ICD-10-CM

## 2024-10-22 PROCEDURE — 97530 THERAPEUTIC ACTIVITIES: CPT | Mod: GP | Performed by: PHYSICAL THERAPIST

## 2024-10-22 PROCEDURE — 97016 VASOPNEUMATIC DEVICE THERAPY: CPT | Mod: GP | Performed by: PHYSICAL THERAPIST

## 2024-10-22 PROCEDURE — 97110 THERAPEUTIC EXERCISES: CPT | Mod: GP | Performed by: PHYSICAL THERAPIST

## 2024-10-22 ASSESSMENT — PAIN SCALES - GENERAL: PAINLEVEL_OUTOF10: 0 - NO PAIN

## 2024-10-22 ASSESSMENT — PAIN - FUNCTIONAL ASSESSMENT: PAIN_FUNCTIONAL_ASSESSMENT: 0-10

## 2024-10-22 NOTE — PROGRESS NOTES
Physical Therapy  Physical Therapy Treatment Note    Patient Name: Darci Eastman  MRN: [id number]  Today's Date: 10/22/2024  Time Calculation  Start Time: 0700  Stop Time: 0810  Time Calculation (min): 70 min    Insurance:  Visit number: 16 of [place]  Authorization info: no auth  Insurance Type: [facility]    Current Problem  1. Displaced fracture of fifth metatarsal bone of left foot with routine healing  Follow Up In Physical Therapy      2. Orthopedic aftercare  Follow Up In Physical Therapy      3. Ankle stiffness, left  Follow Up In Physical Therapy      4. Difficulty walking  Follow Up In Physical Therapy      5. Left leg weakness  Follow Up In Physical Therapy          Precautions:      General  Reason for Referral: s/p ORIF left fifth metatarsal fracture with calcaneus bone graft DOS: 7/15/24  Referred By: Dr. Jamshid Montgomery MD  General Comment: 18 weeks    Pain  Pain Assessment: 0-10  0-10 (Numeric) Pain Score: 0 - No pain    Subjective:   Patient reports he is doing well overall. Pt states he practiced over the weekend with minimal issues.       Performing HEP?: Yes      Objective:         Treatment Performed:  Therapeutic Exercise  Therapeutic Exercise Activity 1: upright bike x6 min (4min TABATA)  Therapeutic Exercise Activity 2: resisted side steeping green TB loop 5 yards x6  Therapeutic Exercise Activity 3: walking DF stretch  Therapeutic Exercise Activity 4: half kneeling calf stretch  Therapeutic Exercise Activity 5: total gym jumps seat 5 1 cord BL x20  Therapeutic Exercise Activity 6: total gym single leg jump seat 5 2x10  Therapeutic Exercise Activity 7: fwd jog bulid ups  Therapeutic Exercise Activity 8: box jumps and landing drills    Therapeutic Activity  Therapeutic Activity 1: blaze pods agility star  Therapeutic Activity 2: frisbee cone drills  Therapeutic Activity 3: cone cutting drills  Therapeutic Activity 4: sprint with catches    Modalities  Modality 1: Untimed  Vasopneumatic        Assessment:   The focus of the session was Motor Control, Dynamic Stability Training, and functional training. The pt demonstrated Good tolerance to the noted exercises today. The pt is demonstrated Good progress in skilled rehab at this time. The pt is still limited in overall Strength, Motor control, and Pain at this time. The pt continues to be a good candidate for skilled PT, in order to further improve Strength, Motor control, and Pain.     Education: load management    Plan:  Progress sports specific drills with cognitive load    Goals:  Active       PT Problem       PT Goal 1       Start:  08/29/24    Expected End:  10/24/24       By discharge, patient will:  Demonstrate independence with home exercise program  Tolerate increased exercise without adverse reaction  Demonstrate improved posture & proper body mechanics throughout session  Increase left ankle ROM to pain free + WNL  Increase left snkle strength to pain free + 5/5 throughout  Report decrease in pain by > 2 points to meet the MCID  Increase score of LEFS by > 9 points to meet the MCID  Ascend / descend stairs without an increase in symptoms  Ambulate x 1000' without an increase in symptoms  Pass return to sport battery               Lavelle Sandhu PT, DPT, OCS, C-OMPT, ITPT

## 2024-10-24 ENCOUNTER — TREATMENT (OUTPATIENT)
Dept: PHYSICAL THERAPY | Facility: HOSPITAL | Age: 24
End: 2024-10-24
Payer: COMMERCIAL

## 2024-10-24 DIAGNOSIS — R26.2 DIFFICULTY WALKING: ICD-10-CM

## 2024-10-24 DIAGNOSIS — M25.672 ANKLE STIFFNESS, LEFT: ICD-10-CM

## 2024-10-24 DIAGNOSIS — S92.352D DISPLACED FRACTURE OF FIFTH METATARSAL BONE OF LEFT FOOT WITH ROUTINE HEALING: ICD-10-CM

## 2024-10-24 DIAGNOSIS — Z47.89 ORTHOPEDIC AFTERCARE: ICD-10-CM

## 2024-10-24 DIAGNOSIS — R29.898 LEFT LEG WEAKNESS: ICD-10-CM

## 2024-10-24 PROCEDURE — 97110 THERAPEUTIC EXERCISES: CPT | Mod: GP | Performed by: PHYSICAL THERAPIST

## 2024-10-24 PROCEDURE — 97016 VASOPNEUMATIC DEVICE THERAPY: CPT | Mod: GP | Performed by: PHYSICAL THERAPIST

## 2024-10-24 NOTE — PROGRESS NOTES
Physical Therapy  Physical Therapy Treatment Note    Patient Name: Darci Eastman  MRN: [id number]  Today's Date: 10/24/2024  Time Calculation  Start Time: 0700  Stop Time: 0810  Time Calculation (min): 70 min    Insurance:  Visit [place]: [place]  Authorization info: no auth  Insurance Type: [facility]    Current Problem  1. Displaced fracture of fifth metatarsal bone of left foot with routine healing  Follow Up In Physical Therapy      2. Orthopedic aftercare  Follow Up In Physical Therapy      3. Ankle stiffness, left  Follow Up In Physical Therapy      4. Difficulty walking  Follow Up In Physical Therapy      5. Left leg weakness  Follow Up In Physical Therapy          General  Reason for Referral: s/p ORIF left fifth metatarsal fracture with calcaneus bone graft DOS: 7/15/24  Referred By: Dr. Jamshid Montgomery MD  General Comment: 18 weeks        Pain  Pain Assessment: 0-10  0-10 (Numeric) Pain Score: 0 - No pain    Subjective:   Patient reports he is feeling well overall. Pt has no new complaints      Performing HEP?: Yes      Objective:         Treatment Performed:  Therapeutic Exercise  Therapeutic Exercise Activity 1: upright bike x6 min (4min TABATA)  Therapeutic Exercise Activity 2: resisted side steeping green TB loop 5 yards x6  Therapeutic Exercise Activity 3: walking DF stretch  Therapeutic Exercise Activity 4: half kneeling calf stretch  Therapeutic Exercise Activity 5: total gym jumps seat 5 1 cord BL x20  Therapeutic Exercise Activity 6: total gym single leg jump seat 5 2x10  Therapeutic Exercise Activity 7: fwd jog bulid ups  Therapeutic Exercise Activity 8: box jumps and landing drills  Therapeutic Exercise Activity 9: vertical leap frisbee catch  Therapeutic Exercise Activity 10: frisbee drills  Therapeutic Exercise Activity 11: sports cord 45 deg hops    Modalities  Modality 1: Untimed Vasopneumatic        Assessment:   The focus of the session was Strengthening and functional training. The  pt demonstrated Good tolerance to the noted exercises today. The pt is demonstrated Good progress in skilled rehab at this time. The pt is still limited in overall Strength at this time. The pt continues to be a good candidate for skilled PT, in order to further improve Strength.     Education: load management    Plan:  Progress functional activities    Goals:  Active       PT Problem       PT Goal 1       Start:  08/29/24    Expected End:  10/24/24       By discharge, patient will:  Demonstrate independence with home exercise program  Tolerate increased exercise without adverse reaction  Demonstrate improved posture & proper body mechanics throughout session  Increase left ankle ROM to pain free + WNL  Increase left snkle strength to pain free + 5/5 throughout  Report decrease in pain by > 2 points to meet the MCID  Increase score of LEFS by > 9 points to meet the MCID  Ascend / descend stairs without an increase in symptoms  Ambulate x 1000' without an increase in symptoms  Pass return to sport battery               Lavelle Sandhu PT, DPT, OCS, C-OMPT, ITPT

## 2024-10-25 ASSESSMENT — PAIN SCALES - GENERAL: PAINLEVEL_OUTOF10: 0 - NO PAIN

## 2024-10-25 ASSESSMENT — PAIN - FUNCTIONAL ASSESSMENT: PAIN_FUNCTIONAL_ASSESSMENT: 0-10

## 2024-10-29 ENCOUNTER — TREATMENT (OUTPATIENT)
Dept: PHYSICAL THERAPY | Facility: HOSPITAL | Age: 24
End: 2024-10-29
Payer: COMMERCIAL

## 2024-10-29 DIAGNOSIS — R29.898 LEFT LEG WEAKNESS: ICD-10-CM

## 2024-10-29 DIAGNOSIS — R26.2 DIFFICULTY WALKING: ICD-10-CM

## 2024-10-29 DIAGNOSIS — S92.352D DISPLACED FRACTURE OF FIFTH METATARSAL BONE OF LEFT FOOT WITH ROUTINE HEALING: ICD-10-CM

## 2024-10-29 DIAGNOSIS — M25.672 ANKLE STIFFNESS, LEFT: ICD-10-CM

## 2024-10-29 DIAGNOSIS — Z47.89 ORTHOPEDIC AFTERCARE: ICD-10-CM

## 2024-10-29 PROCEDURE — 97110 THERAPEUTIC EXERCISES: CPT | Mod: GP | Performed by: PHYSICAL THERAPIST

## 2024-10-29 PROCEDURE — 97016 VASOPNEUMATIC DEVICE THERAPY: CPT | Mod: GP | Performed by: PHYSICAL THERAPIST

## 2024-10-29 PROCEDURE — 97140 MANUAL THERAPY 1/> REGIONS: CPT | Mod: GP | Performed by: PHYSICAL THERAPIST

## 2024-10-29 ASSESSMENT — PAIN SCALES - GENERAL: PAINLEVEL_OUTOF10: 0 - NO PAIN

## 2024-10-29 ASSESSMENT — PAIN - FUNCTIONAL ASSESSMENT: PAIN_FUNCTIONAL_ASSESSMENT: 0-10

## 2024-10-31 ENCOUNTER — TREATMENT (OUTPATIENT)
Dept: PHYSICAL THERAPY | Facility: HOSPITAL | Age: 24
End: 2024-10-31
Payer: COMMERCIAL

## 2024-10-31 DIAGNOSIS — Z47.89 ORTHOPEDIC AFTERCARE: ICD-10-CM

## 2024-10-31 DIAGNOSIS — M25.672 ANKLE STIFFNESS, LEFT: ICD-10-CM

## 2024-10-31 DIAGNOSIS — S92.352D DISPLACED FRACTURE OF FIFTH METATARSAL BONE OF LEFT FOOT WITH ROUTINE HEALING: ICD-10-CM

## 2024-10-31 DIAGNOSIS — R29.898 LEFT LEG WEAKNESS: ICD-10-CM

## 2024-10-31 DIAGNOSIS — R26.2 DIFFICULTY WALKING: ICD-10-CM

## 2024-10-31 PROCEDURE — 97110 THERAPEUTIC EXERCISES: CPT | Mod: GP | Performed by: PHYSICAL THERAPIST

## 2024-10-31 PROCEDURE — 97016 VASOPNEUMATIC DEVICE THERAPY: CPT | Mod: GP | Performed by: PHYSICAL THERAPIST

## 2024-10-31 PROCEDURE — 97140 MANUAL THERAPY 1/> REGIONS: CPT | Mod: GP | Performed by: PHYSICAL THERAPIST

## 2024-11-01 ASSESSMENT — PAIN - FUNCTIONAL ASSESSMENT: PAIN_FUNCTIONAL_ASSESSMENT: 0-10

## 2024-11-01 ASSESSMENT — PAIN SCALES - GENERAL: PAINLEVEL_OUTOF10: 0 - NO PAIN

## 2024-11-05 ENCOUNTER — TREATMENT (OUTPATIENT)
Dept: PHYSICAL THERAPY | Facility: HOSPITAL | Age: 24
End: 2024-11-05
Payer: COMMERCIAL

## 2024-11-05 DIAGNOSIS — Z47.89 ORTHOPEDIC AFTERCARE: ICD-10-CM

## 2024-11-05 DIAGNOSIS — M25.672 ANKLE STIFFNESS, LEFT: ICD-10-CM

## 2024-11-05 DIAGNOSIS — R26.2 DIFFICULTY WALKING: ICD-10-CM

## 2024-11-05 DIAGNOSIS — R29.898 LEFT LEG WEAKNESS: ICD-10-CM

## 2024-11-05 DIAGNOSIS — S92.352D DISPLACED FRACTURE OF FIFTH METATARSAL BONE OF LEFT FOOT WITH ROUTINE HEALING: ICD-10-CM

## 2024-11-05 PROCEDURE — 97016 VASOPNEUMATIC DEVICE THERAPY: CPT | Mod: GP | Performed by: PHYSICAL THERAPIST

## 2024-11-05 PROCEDURE — 97110 THERAPEUTIC EXERCISES: CPT | Mod: GP | Performed by: PHYSICAL THERAPIST

## 2024-11-05 PROCEDURE — 97140 MANUAL THERAPY 1/> REGIONS: CPT | Mod: GP | Performed by: PHYSICAL THERAPIST

## 2024-11-05 ASSESSMENT — PAIN SCALES - GENERAL: PAINLEVEL_OUTOF10: 3

## 2024-11-05 ASSESSMENT — PAIN DESCRIPTION - DESCRIPTORS: DESCRIPTORS: ACHING;DULL

## 2024-11-05 ASSESSMENT — PAIN - FUNCTIONAL ASSESSMENT: PAIN_FUNCTIONAL_ASSESSMENT: 0-10

## 2024-11-05 NOTE — PROGRESS NOTES
Physical Therapy  Physical Therapy Treatment Note    Patient Name: Darci Eastman  MRN: [id number]  Today's Date: 11/5/2024  Time Calculation  Start Time: 0700  Stop Time: 0800  Time Calculation (min): 60 min    Insurance:  Visit number: 20 of [place]  Authorization info: no auth  Insurance Type: [facility]    Current Problem  1. Displaced fracture of fifth metatarsal bone of left foot with routine healing  Follow Up In Physical Therapy      2. Orthopedic aftercare  Follow Up In Physical Therapy      3. Ankle stiffness, left  Follow Up In Physical Therapy      4. Difficulty walking  Follow Up In Physical Therapy      5. Left leg weakness  Follow Up In Physical Therapy            General  Reason for Referral: s/p ORIF left fifth metatarsal fracture with calcaneus bone graft DOS: 7/15/24  Referred By: Dr. Jamshid Montgomery MD  General Comment: 20    Pain  Pain Assessment: 0-10  0-10 (Numeric) Pain Score: 3  Pain Location: Foot  Pain Orientation: Left  Pain Descriptors: Aching, Dull    Subjective:   Patient reports he did compete in [place]. Pt reports it went really well but is very sore today. Pt states his whole left LE extremity is sore but mostly muscle soreness. Pt reports minimal pain to sx site      Performing HEP?: Yes      Objective:       Treatment Performed:  Therapeutic Exercise  Therapeutic Exercise Activity 1: upright bike x6 min (4min TABATA)  Therapeutic Exercise Activity 2: resisted side steeping green TB loop 5 yards x6  Therapeutic Exercise Activity 3: walking DF stretch  Therapeutic Exercise Activity 4: half kneeling calf stretch    Manual Therapy  Manual Therapy Activity 1: STM gastroc/soleus/peroneals IASTM  Manual Therapy Activity 2: TC AP    Modalities  Modality 1: Untimed Vasopneumatic (normatec)        Assessment:   The focus of the session was Stretching, joint mobilizations, and soft tissue massage and active recovery. The pt demonstrated Good tolerance to the noted exercises  today. The pt is demonstrated Good progress in skilled rehab at this time. The pt is still limited in overall Strength, ROM, Flexibility, Motor control, Balance, Gait mechanics, Effusion, and Pain at this time. The pt continues to be a good candidate for skilled PT, in order to further improve Strength, ROM, Flexibility, Motor control, Balance, Gait mechanics, Effusion, and Pain.     Education: active recovery    Plan:  Return to strength and plyo activities    Goals:  Active       PT Problem       PT Goal 1       Start:  08/29/24    Expected End:  10/24/24       By discharge, patient will:  Demonstrate independence with home exercise program  Tolerate increased exercise without adverse reaction  Demonstrate improved posture & proper body mechanics throughout session  Increase left ankle ROM to pain free + WNL  Increase left snkle strength to pain free + 5/5 throughout  Report decrease in pain by > 2 points to meet the MCID  Increase score of LEFS by > 9 points to meet the MCID  Ascend / descend stairs without an increase in symptoms  Ambulate x 1000' without an increase in symptoms  Pass return to sport battery               Lavelle Sandhu PT, DPT, OCS, C-OMPT, ITPT

## 2024-11-07 ENCOUNTER — TREATMENT (OUTPATIENT)
Dept: PHYSICAL THERAPY | Facility: HOSPITAL | Age: 24
End: 2024-11-07
Payer: COMMERCIAL

## 2024-11-07 DIAGNOSIS — Z47.89 ORTHOPEDIC AFTERCARE: ICD-10-CM

## 2024-11-07 DIAGNOSIS — M25.672 ANKLE STIFFNESS, LEFT: ICD-10-CM

## 2024-11-07 DIAGNOSIS — S92.352D DISPLACED FRACTURE OF FIFTH METATARSAL BONE OF LEFT FOOT WITH ROUTINE HEALING: ICD-10-CM

## 2024-11-07 DIAGNOSIS — M25.672 STIFFNESS OF LEFT ANKLE, NOT ELSEWHERE CLASSIFIED: ICD-10-CM

## 2024-11-07 DIAGNOSIS — Z47.89 ENCOUNTER FOR OTHER ORTHOPEDIC AFTERCARE: ICD-10-CM

## 2024-11-07 DIAGNOSIS — R29.898 LEFT LEG WEAKNESS: ICD-10-CM

## 2024-11-07 DIAGNOSIS — S92.352D DISPLACED FRACTURE OF FIFTH METATARSAL BONE, LEFT FOOT, SUBSEQUENT ENCOUNTER FOR FRACTURE WITH ROUTINE HEALING: ICD-10-CM

## 2024-11-07 DIAGNOSIS — R26.2 DIFFICULTY WALKING: ICD-10-CM

## 2024-11-07 PROCEDURE — 97110 THERAPEUTIC EXERCISES: CPT | Mod: GP | Performed by: PHYSICAL THERAPIST

## 2024-11-07 PROCEDURE — 97140 MANUAL THERAPY 1/> REGIONS: CPT | Mod: GP | Performed by: PHYSICAL THERAPIST

## 2024-11-07 PROCEDURE — 97016 VASOPNEUMATIC DEVICE THERAPY: CPT | Mod: GP | Performed by: PHYSICAL THERAPIST

## 2024-11-07 NOTE — PROGRESS NOTES
Physical Therapy  Physical Therapy Treatment Note    Patient Name: Darci Eastman  MRN: [id number]  Today's Date: 11/7/2024  Time Calculation  Start Time: 0700  Stop Time: 0810  Time Calculation (min): 70 min    Insurance:  Visit number: 21 of [place]  Authorization info: no auth  Insurance Type: [facility]       Current Problem  1. Displaced fracture of fifth metatarsal bone of left foot with routine healing  Follow Up In Physical Therapy      2. Orthopedic aftercare  Follow Up In Physical Therapy      3. Ankle stiffness, left  Follow Up In Physical Therapy      4. Difficulty walking  Follow Up In Physical Therapy      5. Left leg weakness  Follow Up In Physical Therapy      6. Displaced fracture of fifth metatarsal bone, left foot, subsequent encounter for fracture with routine healing  Follow Up In Physical Therapy      7. Encounter for other orthopedic aftercare  Follow Up In Physical Therapy      8. Stiffness of left ankle, not elsewhere classified  Follow Up In Physical Therapy          Precautions:      General  Reason for Referral: s/p ORIF left fifth metatarsal fracture with calcaneus bone graft DOS: 7/15/24  Referred By: Dr. Jamshid Montgomery MD      Pain  Pain Assessment: 0-10  0-10 (Numeric) Pain Score: 0 - No pain    Subjective:   Patient reports he is feeling much better than previous session but is still sore      Performing HEP?: Yes      Objective:         Treatment Performed:  Therapeutic Exercise  Therapeutic Exercise Activity 1: upright bike x6 min (4min TABATA)  Therapeutic Exercise Activity 2: resisted side steeping green TB loop 5 yards x6  Therapeutic Exercise Activity 3: walking DF stretch  Therapeutic Exercise Activity 4: half kneeling calf stretch  Therapeutic Exercise Activity 5: Total gym eccentrics 3 cords 2x20  Therapeutic Exercise Activity 6: BFR 80% post tib heel raise 30-15-15-15  Therapeutic Exercise Activity 7: BFR 80% seated heel raise yellow KB + ball squeeze  30-15-15-15  Therapeutic Exercise Activity 8: BFR 80% seated eversion black TB 30-15-15-15  Therapeutic Exercise Activity 9: BFR 80%  ankle DF with wall squat 30-15-15-15    Manual Therapy  Manual Therapy Activity 1: STM gastroc/soleus/peroneals IASTM    Modalities  Modality 1: Untimed Vasopneumatic        Assessment:   The focus of the session was Strengthening and soft tissue massage. The pt demonstrated Good tolerance to the noted exercises today. The pt is demonstrated Good progress in skilled rehab at this time. The pt is still limited in overall Strength, Motor control, and Pain at this time. The pt continues to be a good candidate for skilled PT, in order to further improve Strength, Motor control, and Pain.     Education: active recovery    Plan:  Progress LE strength and power    Goals:  Active       PT Problem       PT Goal 1       Start:  08/29/24    Expected End:  10/24/24       By discharge, patient will:  Demonstrate independence with home exercise program  Tolerate increased exercise without adverse reaction  Demonstrate improved posture & proper body mechanics throughout session  Increase left ankle ROM to pain free + WNL  Increase left snkle strength to pain free + 5/5 throughout  Report decrease in pain by > 2 points to meet the MCID  Increase score of LEFS by > 9 points to meet the MCID  Ascend / descend stairs without an increase in symptoms  Ambulate x 1000' without an increase in symptoms  Pass return to sport battery               Lavelle Sandhu PT, DPT, OCS, C-OMPT, ITPT

## 2024-11-08 ASSESSMENT — PAIN - FUNCTIONAL ASSESSMENT: PAIN_FUNCTIONAL_ASSESSMENT: 0-10

## 2024-11-08 ASSESSMENT — PAIN SCALES - GENERAL: PAINLEVEL_OUTOF10: 0 - NO PAIN

## 2024-11-14 ENCOUNTER — TREATMENT (OUTPATIENT)
Dept: PHYSICAL THERAPY | Facility: HOSPITAL | Age: 24
End: 2024-11-14
Payer: COMMERCIAL

## 2024-11-14 DIAGNOSIS — Z47.89 ENCOUNTER FOR OTHER ORTHOPEDIC AFTERCARE: ICD-10-CM

## 2024-11-14 DIAGNOSIS — S92.352D DISPLACED FRACTURE OF FIFTH METATARSAL BONE, LEFT FOOT, SUBSEQUENT ENCOUNTER FOR FRACTURE WITH ROUTINE HEALING: ICD-10-CM

## 2024-11-14 DIAGNOSIS — M25.672 STIFFNESS OF LEFT ANKLE, NOT ELSEWHERE CLASSIFIED: ICD-10-CM

## 2024-11-14 PROCEDURE — 97016 VASOPNEUMATIC DEVICE THERAPY: CPT | Mod: GP | Performed by: PHYSICAL THERAPIST

## 2024-11-14 PROCEDURE — 97110 THERAPEUTIC EXERCISES: CPT | Mod: GP | Performed by: PHYSICAL THERAPIST

## 2024-11-14 ASSESSMENT — PAIN - FUNCTIONAL ASSESSMENT: PAIN_FUNCTIONAL_ASSESSMENT: 0-10

## 2024-11-14 ASSESSMENT — PAIN SCALES - GENERAL: PAINLEVEL_OUTOF10: 0 - NO PAIN

## 2024-11-14 NOTE — PROGRESS NOTES
Physical Therapy  Physical Therapy Treatment Note    Patient Name: Darci Eastman  MRN: [id number]  Today's Date:11/14/2024  Time Calculation  Start Time: 0700  Stop Time: 0800  Time Calculation (min): 60 min    Insurance:  Visit number: 22 of [place]  Authorization info: no auth  Insurance Type: [facility]    Current Problem  1. Displaced fracture of fifth metatarsal bone, left foot, subsequent encounter for fracture with routine healing  Follow Up In Physical Therapy      2. Encounter for other orthopedic aftercare  Follow Up In Physical Therapy      3. Stiffness of left ankle, not elsewhere classified  Follow Up In Physical Therapy          Precautions:      General  Reason for Referral: s/p ORIF left fifth metatarsal fracture with calcaneus bone graft DOS: 7/15/24  Referred By: Dr. Jamshid Montgomery MD  General Comment: POW: 17        Pain  Pain Assessment: 0-10  0-10 (Numeric) Pain Score: 0 - No pain    Subjective:   Patient reports he is doing ok overall. Pt states he is noticing some discomfort and tightness on dorsal aspect of foot.       Performing HEP?: Yes      Objective:         Treatment Performed:  Therapeutic Exercise  Therapeutic Exercise Activity 1: upright bike x6 min (4min TABATA)  Therapeutic Exercise Activity 2: resisted side steeping green TB loop 5 yards x6  Therapeutic Exercise Activity 3: walking DF stretch  Therapeutic Exercise Activity 4: half kneeling calf stretch  Therapeutic Exercise Activity 5: Step up and KB press on BOSU 3x15 each side 18# KB  Therapeutic Exercise Activity 6: Sled push  Therapeutic Exercise Activity 7: Lateral sled pulls 35#    Manual Therapy  Manual Therapy Activity 1: STM gastroc/soleus/peroneals  Manual Therapy Activity 2: mid foot mobs  Manual Therapy Activity 3: TC AP and distraction grades 3-4      Assessment:   The focus of the session was Strengthening, ROM, Stretching, joint mobilizations, soft tissue massage, and functional training. The pt demonstrated Good  tolerance to the noted exercises today. The pt is demonstrated Good progress in skilled rehab at this time. The pt is still limited in overall Strength, ROM, Motor control, Gait mechanics, and Pain at this time. The pt continues to be a good candidate for skilled PT, in order to further improve Strength, ROM, Flexibility, Motor control, Balance, Gait mechanics, and Pain.     Education: continue with HEP as prescribed    Plan:  Progress strength and functional tasks    Goals:  Active       PT Problem       PT Goal 1       Start:  08/29/24    Expected End:  10/24/24       By discharge, patient will:  Demonstrate independence with home exercise program  Tolerate increased exercise without adverse reaction  Demonstrate improved posture & proper body mechanics throughout session  Increase left ankle ROM to pain free + WNL  Increase left snkle strength to pain free + 5/5 throughout  Report decrease in pain by > 2 points to meet the MCID  Increase score of LEFS by > 9 points to meet the MCID  Ascend / descend stairs without an increase in symptoms  Ambulate x 1000' without an increase in symptoms  Pass return to sport battery               Lavelle Sandhu PT, DPT, OCS, C-OMPT, ITPT

## 2024-11-15 ASSESSMENT — PAIN SCALES - GENERAL: PAINLEVEL_OUTOF10: 0 - NO PAIN

## 2024-11-15 ASSESSMENT — PAIN - FUNCTIONAL ASSESSMENT: PAIN_FUNCTIONAL_ASSESSMENT: 0-10

## 2024-11-19 ENCOUNTER — TREATMENT (OUTPATIENT)
Dept: PHYSICAL THERAPY | Facility: HOSPITAL | Age: 24
End: 2024-11-19
Payer: COMMERCIAL

## 2024-11-19 DIAGNOSIS — Z47.89 ENCOUNTER FOR OTHER ORTHOPEDIC AFTERCARE: ICD-10-CM

## 2024-11-19 DIAGNOSIS — S92.352D DISPLACED FRACTURE OF FIFTH METATARSAL BONE, LEFT FOOT, SUBSEQUENT ENCOUNTER FOR FRACTURE WITH ROUTINE HEALING: ICD-10-CM

## 2024-11-19 DIAGNOSIS — M25.672 STIFFNESS OF LEFT ANKLE, NOT ELSEWHERE CLASSIFIED: ICD-10-CM

## 2024-11-19 PROCEDURE — 97110 THERAPEUTIC EXERCISES: CPT | Mod: GP | Performed by: PHYSICAL THERAPIST

## 2024-11-19 PROCEDURE — 97530 THERAPEUTIC ACTIVITIES: CPT | Mod: GP | Performed by: PHYSICAL THERAPIST

## 2024-11-19 PROCEDURE — 97016 VASOPNEUMATIC DEVICE THERAPY: CPT | Mod: GP | Performed by: PHYSICAL THERAPIST

## 2024-11-19 PROCEDURE — 97112 NEUROMUSCULAR REEDUCATION: CPT | Mod: GP | Performed by: PHYSICAL THERAPIST

## 2024-11-19 ASSESSMENT — PAIN - FUNCTIONAL ASSESSMENT: PAIN_FUNCTIONAL_ASSESSMENT: 0-10

## 2024-11-19 ASSESSMENT — PAIN SCALES - GENERAL: PAINLEVEL_OUTOF10: 0 - NO PAIN

## 2024-11-19 NOTE — PROGRESS NOTES
"  Physical Therapy  Physical Therapy Treatment Note    Patient Name: Darci Eastman  MRN: [id number]  Today's Date: 11/19/2024  Time Calculation  Start Time: 0700  Stop Time: 0810  Time Calculation (min): 70 min    Insurance:  Visit number: 23 of [place]  Authorization info: no auth  Insurance Type: [facility]    Current Problem  1. Displaced fracture of fifth metatarsal bone, left foot, subsequent encounter for fracture with routine healing  Follow Up In Physical Therapy      2. Encounter for other orthopedic aftercare  Follow Up In Physical Therapy      3. Stiffness of left ankle, not elsewhere classified  Follow Up In Physical Therapy          Precautions:      General  Reason for Referral: s/p ORIF left fifth metatarsal fracture with calcaneus bone graft DOS: 7/15/24  Referred By: Dr. Jamshid Montgomery MD      Pain  Pain Assessment: 0-10  0-10 (Numeric) Pain Score: 0 - No pain    Subjective:   Patient reports he is doing well overall. Pt states this is the best he has felt since tryouts.       Performing HEP?: Yes      Objective:         Treatment Performed:  Therapeutic Exercise  Therapeutic Exercise Activity 1: upright bike x6 min (4min TABATA)  Therapeutic Exercise Activity 2: resisted side steeping green TB loop 5 yards x6  Therapeutic Exercise Activity 3: walking DF stretch  Therapeutic Exercise Activity 4: half kneeling calf stretch  Therapeutic Exercise Activity 5: cross over sled pull 45# 10 yards x5  Therapeutic Exercise Activity 6: split stance heel raise 3x12 green KB x2  Therapeutic Exercise Activity 7: BL POGOS with band 3x20    Therapeutic Activity  Therapeutic Activity 1: 12\" BL box jumps 1x15  Therapeutic Activity 2: 12\" BL box jumps 1x15  Therapeutic Activity 3: 20\" step offs 1x15  Therapeutic Activity 4: 20\" step off to BL squat jump 2x10    Balance/Neuromuscular Re-Education  Balance/Neuromuscular Re-Education Activity 1: BOSU retro tap 2x10  Balance/Neuromuscular Re-Education Activity 2: BOSU " "lateral and curtsy 5x5  Balance/Neuromuscular Re-Education Activity 3: 20\" step offs 1x15  Balance/Neuromuscular Re-Education Activity 4: 20\" step off to BL squat jump 2x10  Balance/Neuromuscular Re-Education Activity 5: split squat on AIREX with unilateral KB hold yellow      Assessment:   The focus of the session was Strengthening, Balance, Motor Control, and functional training. The pt demonstrated Good tolerance to the noted exercises today. The pt is demonstrated Good progress in skilled rehab at this time. The pt is still limited in overall Strength, ROM, Flexibility, Motor control, Balance, Gait mechanics, Effusion, and Pain at this time. The pt continues to be a good candidate for skilled PT, in order to further improve Strength, ROM, Flexibility, Motor control, Balance, Gait mechanics, Effusion, and Pain.     Education: progress ploys    Plan:  Work on power    Goals:  Active       PT Problem       PT Goal 1       Start:  08/29/24    Expected End:  10/24/24       By discharge, patient will:  Demonstrate independence with home exercise program  Tolerate increased exercise without adverse reaction  Demonstrate improved posture & proper body mechanics throughout session  Increase left ankle ROM to pain free + WNL  Increase left snkle strength to pain free + 5/5 throughout  Report decrease in pain by > 2 points to meet the MCID  Increase score of LEFS by > 9 points to meet the MCID  Ascend / descend stairs without an increase in symptoms  Ambulate x 1000' without an increase in symptoms  Pass return to sport battery               Lavelle Sandhu PT, DPT, OCS, C-OMPT, ITPT  "

## 2024-11-20 ENCOUNTER — OFFICE VISIT (OUTPATIENT)
Dept: ORTHOPEDIC SURGERY | Facility: CLINIC | Age: 24
End: 2024-11-20
Payer: COMMERCIAL

## 2024-11-20 ENCOUNTER — HOSPITAL ENCOUNTER (OUTPATIENT)
Dept: RADIOLOGY | Facility: CLINIC | Age: 24
Discharge: HOME | End: 2024-11-20
Payer: COMMERCIAL

## 2024-11-20 DIAGNOSIS — S92.352A CLOSED FRACTURE OF BASE OF FIFTH METATARSAL BONE OF LEFT FOOT, INITIAL ENCOUNTER: ICD-10-CM

## 2024-11-20 PROCEDURE — 99213 OFFICE O/P EST LOW 20 MIN: CPT | Performed by: STUDENT IN AN ORGANIZED HEALTH CARE EDUCATION/TRAINING PROGRAM

## 2024-11-20 PROCEDURE — 73630 X-RAY EXAM OF FOOT: CPT | Mod: LT

## 2024-11-20 PROCEDURE — 73630 X-RAY EXAM OF FOOT: CPT | Mod: LEFT SIDE | Performed by: RADIOLOGY

## 2024-11-20 ASSESSMENT — PAIN SCALES - GENERAL: PAINLEVEL_OUTOF10: 0 - NO PAIN

## 2024-11-20 ASSESSMENT — PAIN - FUNCTIONAL ASSESSMENT: PAIN_FUNCTIONAL_ASSESSMENT: 0-10

## 2024-11-20 NOTE — PROGRESS NOTES
ORTHOPAEDIC SURGERY PROGRESS NOTE    Chief Complaint:  Left foot pain    History Of Present Illness  Darci Eastman is a 24 y.o. male who presents for evaluation of left foot pain as a referral from Dr. Chan.  Patient initially sustained an injury to his left foot and October 2023.  Portions of the history are provided both by the patient as well as EMR and review of care everywhere.  This was treated conservatively.  Patient was later seen in January 2024 with mild persistent pain.  He was then seen on 06/17/2024 for acute on chronic left foot pain.  He sustained a awkward landing while playing ultimate Frisbee from 06/14/2024.  He actually thinks he was going up for the [employer] when he noticed pain.  He was seen in an urgent care setting time and made nonweightbearing in his left lower extremity in a fracture boot.    Patient was intermittently treated for presumed peroneal tendinitis after attending a walk-in therapy student clinic at [employer].  He has been able to continue playing but has noticed a nagging pain.  He has been able to play through the pain.    He currently reports minimal with pain nonweightbearing.  He has been compliant with nonweightbearing restrictions in a tall walking boot.  He has had a recent MRI and is here for review.    Patient does not use tobacco products.  He has recently graduated and is on the job hunt.    07/31/2024: Patient returns s/p ORIF left fifth metatarsal fracture with calcaneus bone graft from 07/15/2024.  Patient has been compliant with nonweightbearing restrictions utilizing a knee scooter.  He has been on aspirin for DVT prophylaxis.  He has had noted improvement in his right foot pain from prior to surgery.  He has been mobilizing his right foot.  He is not reporting any numbness in his left foot.  He is maintained on aspirin for DVT prophylaxis and VitD/Ca.    08/13/2024: Patient returns s/p ORIF left fifth metatarsal fracture with calcaneus bone graft from  07/15/2024.  Patient is reporting 3 out of 10 pain in the left foot, that is gradually improving.  He has been compliant with nonweightbearing restrictions utilizing a knee scooter.  He has been on aspirin for DVT prophylaxis.  He notes continued improvement with respect to his right foot pain.  He denies numbness, tingling or weakness in the LLE.    09/10/2024: Patient returns s/p ORIF left fifth metatarsal fracture with calcaneus bone graft from 07/15/2024.  He is currently reporting 2 out of 10 pain.  He has been in physical therapy.  He has fully transitioned out of the walking boot, he continues to note some pain particularly with lateral movement.  He is comfortable walking but does not yet fully trust his foot for support.    10/08/2024: Patient returns s/p ORIF left fifth metatarsal fracture with calcaneus bone graft from 07/15/2024.  He is currently reporting no pain.  He has continued in physical therapy and has been steadily increasing his impact activity with the [employer] apparatus.  He believes the majority of his foot pain is related to soft tissue discomfort and he has been steadily improving.  He is anticipating tryouts in the beginning of November and does not yet fully trust his foot.    11/20/2024: Patient returns s/p ORIF left fifth metatarsal fracture with calcaneus bone graft from 07/15/2024.  Patient is currently reporting no pain.  He was able to compete in an invitation only try out in [place] for ultimate [employer].  He has been in physical therapy.  He reports that he has to really try during day-to-day life to put pressure on the foot in a way that he would never walk to have pain.     Past Medical History  Past Medical History:   Diagnosis Date    Closed fracture of base of fifth metatarsal bone of left foot, initial encounter        Surgical History  Recent Surgeries in Orthopaedic Surgery            No cases to display             Social History  Social History     Socioeconomic History     Marital status: Single   Tobacco Use    Smoking status: Never    Smokeless tobacco: Never   Vaping Use    Vaping status: Never Used   Substance and Sexual Activity    Alcohol use: Yes     Alcohol/week: 6.0 standard drinks of alcohol     Types: 6 Standard drinks or equivalent per week    Drug use: Yes     Types: Marijuana     Comment: last use April - no other drugs    Sexual activity: Defer       Family History  Family History   Problem Relation Name Age of Onset    ALS Mother      Hypertension Father      No Known Problems Sister      No Known Problems Brother          Allergies  No Known Allergies    Review of Systems  REVIEW OF SYSTEMS  Constitutional: no unplanned weight loss  Psychiatric: no suicidal ideation  ENT: no vision changes, no sinus problems  Pulmonary: no shortness of breath  Lymphatic: no enlarged lymph nodes  Cardiovascular: no chest pain or shortness of breath  Gastrointestinal: no stomach problems  Genitourinary: no dysuria   Skin: no rashes  Endocrine: no thyroid problems  Neurological: no headache, no numbness  Hematological: no easy bruising  Musculoskeletal: Left foot pain    Physical Exam  PHYSICAL EXAMINATION  Constitutional Exam: well developed and well nourished  Psychiatric Exam: alert and oriented, appropriate mood and behavior  Eye Exam: EOMI  Pulmonary Exam: breathing non-labored, no apparent distress  Lymphatic exam: no appreciable lymphadenopathy in the lower extremities  Cardiovascular exam: RRR to peripheral palpation, DP pulses 2+, PT 2+, toes are pink with good capillary refill, no pitting edema  Skin exam: no open lesions, rashes, abrasions or ulcerations  Neurological exam: sensation to light touch intact in both lower extremities in peripheral and dermatomal distributions (except for any abnormalities noted in musculoskeletal exam)    Musculoskeletal exam: Left lower extremity examination.  Patient surgical incisions at calcaneus and lateral fifth metatarsal well-healed.   Nontender to palpation of the fifth metatarsal.  No pain with attempted midfoot stress. Patient has pain-free and supple ankle, subtalar midtarsal joint range of motion.  Patient has sensation intact to light touch grossly in a saphenous, sural, superficial peroneal, deep peroneal and tibial nerve distribution. He has intact PF/DF/EHL. 2+ Dp/pt pulses.    Last Recorded Vitals  There were no vitals taken for this visit.    Laboratory Results    No results found for this or any previous visit (from the past 24 hours).    Radiology Results   X-ray imaging 3 view weightbearing left foot reviewed and independently evaluated by me on 11/20/2024 demonstrates appropriate position, healing and alignment following fifth metatarsal plantar plating without margarita-implant fracture, lucency or loosening.    Assessment/Plan:  24-year-old male s/p ORIF left fifth metatarsal fracture with calcaneus bone graft from 07/15/2024 who presents with both clinical and radiographic evidence of healing.  I would recommend the patient continue weightbearing to his tolerance in his left lower extremity, I have no formal restrictions for him.  He should continue with his physical therapy at this time and I would plan to see him back in 4 months to follow his clinical course.  We discussed the potential for hardware breakage but given his pain level at this time and return to play would not recommend prophylactically removing the plate.  Upon return patient would require three-view weightbearing left foot.    Jamshid Montgomery MD, LAURA  Department of Orthopaedic Surgery  [facility]    The diagnosis and treatment plan were reviewed with the patient. All questions were answered. The patient verbalized understanding of the treatment plan. There were no barriers to understanding identified.    Note dictated with [employer] software.  Completed without full type editing and sent to avoid delay.

## 2024-11-26 ENCOUNTER — APPOINTMENT (OUTPATIENT)
Dept: ORTHOPEDIC SURGERY | Facility: CLINIC | Age: 24
End: 2024-11-26
Payer: COMMERCIAL

## 2024-12-03 ENCOUNTER — TREATMENT (OUTPATIENT)
Dept: PHYSICAL THERAPY | Facility: HOSPITAL | Age: 24
End: 2024-12-03
Payer: COMMERCIAL

## 2024-12-03 DIAGNOSIS — M25.672 STIFFNESS OF LEFT ANKLE, NOT ELSEWHERE CLASSIFIED: ICD-10-CM

## 2024-12-03 DIAGNOSIS — Z47.89 ENCOUNTER FOR OTHER ORTHOPEDIC AFTERCARE: ICD-10-CM

## 2024-12-03 DIAGNOSIS — S92.352D DISPLACED FRACTURE OF FIFTH METATARSAL BONE, LEFT FOOT, SUBSEQUENT ENCOUNTER FOR FRACTURE WITH ROUTINE HEALING: ICD-10-CM

## 2024-12-03 PROCEDURE — 97110 THERAPEUTIC EXERCISES: CPT | Mod: GP | Performed by: PHYSICAL THERAPIST

## 2024-12-03 PROCEDURE — 97016 VASOPNEUMATIC DEVICE THERAPY: CPT | Mod: GP | Performed by: PHYSICAL THERAPIST

## 2024-12-03 PROCEDURE — 97530 THERAPEUTIC ACTIVITIES: CPT | Mod: GP | Performed by: PHYSICAL THERAPIST

## 2024-12-03 ASSESSMENT — PAIN - FUNCTIONAL ASSESSMENT: PAIN_FUNCTIONAL_ASSESSMENT: 0-10

## 2024-12-03 ASSESSMENT — PAIN SCALES - GENERAL: PAINLEVEL_OUTOF10: 0 - NO PAIN

## 2024-12-03 NOTE — PROGRESS NOTES
Physical Therapy  Physical Therapy Treatment Note    Patient Name: Darci Eastman  MRN: [id number]  Today's Date: 12/3/2024  Time Calculation  Start Time: 0700  Stop Time: 0810  Time Calculation (min): 70 min    Insurance:  Visit number: 24 of [place]  Authorization info: no auth  Insurance Type: [facility]    Current Problem  1. Displaced fracture of fifth metatarsal bone, left foot, subsequent encounter for fracture with routine healing  Follow Up In Physical Therapy      2. Encounter for other orthopedic aftercare  Follow Up In Physical Therapy      3. Stiffness of left ankle, not elsewhere classified  Follow Up In Physical Therapy          Precautions:      General  Reason for Referral: s/p ORIF left fifth metatarsal fracture with calcaneus bone graft DOS: 7/15/24  Referred By: Dr. Jamshid Montgomery MD      Pain  Pain Assessment: 0-10  0-10 (Numeric) Pain Score: 0 - No pain    Subjective:   Patient reports feeling better overall. He went hiking recently and noted soreness but it went away within two days. He would like to work on jumping activities today.      Performing HEP?: Yes      Objective:     LE Y-Balance Test        Pass: Yes      Left Right Difference* Limb Length:   (ASIS to med mal)   Anterior 64 /   72   / 74    Av 68 /   71   / 74    Av 1 cm Left Right   Posteromedial 120 /   123   / 122    Av.7 120 /   125   / 123   Av.7 1 cm N/A N/A   Posterolateral 119 /   118   / 119    Av.7 118 /   121   / 114    Av.7 +1 cm   Composite Score^      3 trials, record maximal reach in each direction  *Difference should be less than 4cm for return to sport; <4 cm = pass  ^Composite Score= (Medial + posteromedial + posterolateral)/(3x limb length)  X  100    T Agility Drill         Pass: Yes     Trial /2/3 Best   11.11 /   10.87   /  10.54       10.54 sec   * < 11 seconds to pass    Force Plate Counter Movement Jump     Pass: Partially  Metric (passing LSI/score) Score Pass   L/R  Propulsive Impulse Index LSI (>=95%)   97.1% Yes   L/R Braking Impulse Index LSI (>=90%)   97.2% Yes   [place] Force LSI (>=90%)   87.3% No   Peak breaking velocity (<=-1.2 (m/s))   -1.43 m/s No     Side Hop Test  Right:   52     Left:    47    LSI:   90.3%  Pass: Yes     LSI >=90% to pass    Functional Hop Testing- Knee      Pass:   Partially       Uninvolved Side (R) Involved Side (L) LSI   Single Limb Hop (cm) 1 2 3 Avg 1 2 3 Avg 95.8%    191 210 216 205.6 184 191 216 197    Triple Hop (cm) 1 2 3 Avg 1 2 3 Avg 90%    614 596 609 606.3 524 538 576 546    Crossover Hop (cm) 1 2 3 Avg 1 2 3 Avg 88.7%    501 477 534 504 434 480 428 447.3    *LSI difference < 10% to pass      Treatment Performed:  Therapeutic Exercise  Therapeutic Exercise Activity 1: upright bike x6 min (4min TABATA)  Therapeutic Exercise Activity 2: resisted side steeping green TB loop 5 yards x6  Therapeutic Exercise Activity 3: walking DF stretch  Therapeutic Exercise Activity 4: half kneeling calf stretch  Therapeutic Exercise Activity 5: total gym level 1 DL jumps 2 yellow cords x20  Therapeutic Exercise Activity 6: total gym level 1 alternating SL jumps 2 yellow cords x20  Therapeutic Exercise Activity 7: total gym level 1 SL jumps 2 yellow cords x10 each leg    Therapeutic Activity  Therapeutic Activity 1: Y-balance testing  Therapeutic Activity 2: T-agility drill testing  Therapeutic Activity 3: Force plate counter-movement jump testing  Therapeutic Activity 4: Side hop testing  Therapeutic Activity 5: Functional hop testing    Modalities  Modality 1: Untimed Fluidotherapy (10min, 34 degrees, high compression, BL ankles)      Assessment:   The focus of the session was dynamic stability training and functional training/testing. The pt demonstrated Good tolerance to the noted exercises today. Even with all the functional testing including hopping, he did not report any increased pain. He did not frustration in note feeling like he could  hop as well on his surgical side. He did pass single leg and triple hop functional broad jumps, however, he noted difficulty with medial and lateral hopping especially crossover and side hop testing. The pt is demonstrated Good progress in skilled rehab at this time. The pt is still limited in overall motor control and functional hopping at this time. The pt continues to be a good candidate for skilled PT, in order to further improve motor control, sport specific activities and functional hopping.     Education: progress plyos, focus on quality of lateral/medial hopping    Plan:  Continue focusing on power and quality of side hopping for painfree return to participation    Goals:  Active       PT Problem       PT Goal 1       Start:  08/29/24    Expected End:  10/24/24       By discharge, patient will:  Demonstrate independence with home exercise program  Tolerate increased exercise without adverse reaction  Demonstrate improved posture & proper body mechanics throughout session  Increase left ankle ROM to pain free + WNL  Increase left snkle strength to pain free + 5/5 throughout  Report decrease in pain by > 2 points to meet the MCID  Increase score of LEFS by > 9 points to meet the MCID  Ascend / descend stairs without an increase in symptoms  Ambulate x 1000' without an increase in symptoms  Pass return to sport battery             Clari Ray, MS, AT, ATC       Lavelle Sandhu PT, DPT, OCS, C-OMPT, ITPT

## 2024-12-10 ENCOUNTER — TREATMENT (OUTPATIENT)
Dept: PHYSICAL THERAPY | Facility: HOSPITAL | Age: 24
End: 2024-12-10
Payer: COMMERCIAL

## 2024-12-10 DIAGNOSIS — Z47.89 ENCOUNTER FOR OTHER ORTHOPEDIC AFTERCARE: ICD-10-CM

## 2024-12-10 DIAGNOSIS — S92.352D DISPLACED FRACTURE OF FIFTH METATARSAL BONE, LEFT FOOT, SUBSEQUENT ENCOUNTER FOR FRACTURE WITH ROUTINE HEALING: ICD-10-CM

## 2024-12-10 DIAGNOSIS — M25.672 STIFFNESS OF LEFT ANKLE, NOT ELSEWHERE CLASSIFIED: ICD-10-CM

## 2024-12-10 PROCEDURE — 97112 NEUROMUSCULAR REEDUCATION: CPT | Mod: GP | Performed by: PHYSICAL THERAPIST

## 2024-12-10 PROCEDURE — 97110 THERAPEUTIC EXERCISES: CPT | Mod: GP | Performed by: PHYSICAL THERAPIST

## 2024-12-10 PROCEDURE — 97016 VASOPNEUMATIC DEVICE THERAPY: CPT | Mod: GP | Performed by: PHYSICAL THERAPIST

## 2024-12-10 PROCEDURE — 97530 THERAPEUTIC ACTIVITIES: CPT | Mod: GP | Performed by: PHYSICAL THERAPIST

## 2024-12-10 ASSESSMENT — PAIN - FUNCTIONAL ASSESSMENT
PAIN_FUNCTIONAL_ASSESSMENT: 0-10
PAIN_FUNCTIONAL_ASSESSMENT: 0-10

## 2024-12-10 ASSESSMENT — PAIN SCALES - GENERAL
PAINLEVEL_OUTOF10: 0 - NO PAIN
PAINLEVEL_OUTOF10: 0 - NO PAIN

## 2024-12-10 NOTE — PROGRESS NOTES
"  Physical Therapy  Physical Therapy Treatment Note    Patient Name: Darci Eastman  MRN: [id number]  Today's Date: 12/10/2024  Time Calculation  Start Time: 0700  Stop Time: 0810  Time Calculation (min): 70 min    Insurance:  Visit number: 25 of [place]  Authorization info: no auth  Insurance Type: [facility]    Current Problem  1. Displaced fracture of fifth metatarsal bone, left foot, subsequent encounter for fracture with routine healing  Follow Up In Physical Therapy      2. Encounter for other orthopedic aftercare  Follow Up In Physical Therapy      3. Stiffness of left ankle, not elsewhere classified  Follow Up In Physical Therapy          Precautions:      General  Reason for Referral: s/p ORIF left fifth metatarsal fracture with calcaneus bone graft DOS: 7/15/24  Referred By: Dr. Jamshid Montgomery MD      Pain  Pain Assessment: 0-10  0-10 (Numeric) Pain Score: 0 - No pain    Subjective:   Patient reports he is feeling well overall. Pt states he continues to feel fatigue with push offs and lacking power      Performing HEP?: Yes      Objective:         Treatment Performed:  Therapeutic Exercise  Therapeutic Exercise Activity 1: upright bike x6 min (4min TABATA)  Therapeutic Exercise Activity 2: resisted side steeping green TB loop 5 yards x6  Therapeutic Exercise Activity 3: walking DF stretch  Therapeutic Exercise Activity 4: half kneeling calf stretch  Therapeutic Exercise Activity 5: total gym level 1 DL jumps 2 yellow cords x20  Therapeutic Exercise Activity 6: total gym level 1 alternating SL jumps 2 yellow cords x20  Therapeutic Exercise Activity 7: total gym level 1 SL jumps 2 yellow cords x10 each leg    Therapeutic Activity  Therapeutic Activity 1: 20\" box jumps 2x12  Therapeutic Activity 2: sitting to 20\" box jump 5x5  Therapeutic Activity 3: sitting to single leg 12\" box jump  Therapeutic Activity 4: DL broad jump x2 with lateral cut x8 each    Balance/Neuromuscular Re-Education  Balance/Neuromuscular " "Re-Education Activity 1: heel elevated varus and valgus force with pertubations 5x20\" each    Modalities  Modality 1: Untimed Fluidotherapy      Assessment:   The focus of the session was Strengthening, Balance, Motor Control, Dynamic Stability Training, and functional training. The pt demonstrated Good tolerance to the noted exercises today. The pt is demonstrated Good progress in skilled rehab at this time. The pt is still limited in overall Strength, Motor control, and Balance at this time. The pt continues to be a good candidate for skilled PT, in order to further improve Strength, Motor control, and Balance.     Education: updated HEP    Plan:  Progress power and stability    Goals:  Active       PT Problem       PT Goal 1       Start:  08/29/24    Expected End:  10/24/24       By discharge, patient will:  Demonstrate independence with home exercise program  Tolerate increased exercise without adverse reaction  Demonstrate improved posture & proper body mechanics throughout session  Increase left ankle ROM to pain free + WNL  Increase left snkle strength to pain free + 5/5 throughout  Report decrease in pain by > 2 points to meet the MCID  Increase score of LEFS by > 9 points to meet the MCID  Ascend / descend stairs without an increase in symptoms  Ambulate x 1000' without an increase in symptoms  Pass return to sport battery               Lavelle Sandhu PT, DPT, OCS, C-OMPT, ITPT  "

## 2024-12-17 ENCOUNTER — TREATMENT (OUTPATIENT)
Dept: PHYSICAL THERAPY | Facility: HOSPITAL | Age: 24
End: 2024-12-17
Payer: COMMERCIAL

## 2024-12-17 DIAGNOSIS — Z47.89 ENCOUNTER FOR OTHER ORTHOPEDIC AFTERCARE: ICD-10-CM

## 2024-12-17 DIAGNOSIS — M25.672 STIFFNESS OF LEFT ANKLE, NOT ELSEWHERE CLASSIFIED: ICD-10-CM

## 2024-12-17 DIAGNOSIS — S92.352D DISPLACED FRACTURE OF FIFTH METATARSAL BONE, LEFT FOOT, SUBSEQUENT ENCOUNTER FOR FRACTURE WITH ROUTINE HEALING: ICD-10-CM

## 2024-12-17 PROCEDURE — 97110 THERAPEUTIC EXERCISES: CPT | Mod: GP | Performed by: PHYSICAL THERAPIST

## 2024-12-17 PROCEDURE — 97530 THERAPEUTIC ACTIVITIES: CPT | Mod: GP | Performed by: PHYSICAL THERAPIST

## 2024-12-17 ASSESSMENT — PAIN - FUNCTIONAL ASSESSMENT: PAIN_FUNCTIONAL_ASSESSMENT: 0-10

## 2024-12-17 ASSESSMENT — PAIN SCALES - GENERAL: PAINLEVEL_OUTOF10: 0 - NO PAIN

## 2024-12-17 NOTE — PROGRESS NOTES
"  Physical Therapy  Physical Therapy Treatment Note    Patient Name: Darci Eastman  MRN: [id number]  Today's Date: 12/17/2024  Time Calculation  Start Time: 0650  Stop Time: 0800  Time Calculation (min): 70 min    Insurance:  Visit number: 26 of [place]  Authorization info: no auth  Insurance Type: [facility]    Current Problem  1. Displaced fracture of fifth metatarsal bone, left foot, subsequent encounter for fracture with routine healing  Follow Up In Physical Therapy      2. Encounter for other orthopedic aftercare  Follow Up In Physical Therapy      3. Stiffness of left ankle, not elsewhere classified  Follow Up In Physical Therapy          General  Reason for Referral: s/p ORIF left fifth metatarsal fracture with calcaneus bone graft DOS: 7/15/24  Referred By: Dr. Jamshid Montgomery MD      Pain  Pain Assessment: 0-10  0-10 (Numeric) Pain Score: 0 - No pain    Subjective:   Patient reports he is doing well overall. Pt denies any new complaints      Performing HEP?: Yes      Objective:         Treatment Performed:  Therapeutic Exercise  Therapeutic Exercise Activity 1: upright bike x6 min (4min TABATA)  Therapeutic Exercise Activity 2: resisted side steeping green TB loop 5 yards x6  Therapeutic Exercise Activity 3: walking DF stretch  Therapeutic Exercise Activity 4: half kneeling calf stretch  Therapeutic Exercise Activity 5: total gym level 1 DL jumps 2 yellow cords x20  Therapeutic Exercise Activity 6: total gym level 1 alternating SL jumps 2 yellow cords x20  Therapeutic Exercise Activity 7: total gym level 1 SL jumps 2 yellow cords x10 each leg    Therapeutic Activity  Therapeutic Activity 1: 20\" box jumps 2x12  Therapeutic Activity 2: sitting to 20\" box jump 5x5  Therapeutic Activity 3: sitting to 20\" box jump 5x5  Therapeutic Activity 4: DL broad jump x2 with lateral cut x8 each  Therapeutic Activity 5: cone sprinting drill with reaction      Assessment:   The focus of the session was Strengthening and " functional training. The pt demonstrated Good tolerance to the noted exercises today. The pt is demonstrated Good progress in skilled rehab at this time. The pt is still limited in overall Motor control at this time. The pt continues to be a good candidate for skilled PT, in order to further improve Strength.     Education: continue HEP as prescribed    Plan:  Return to sport testing    Goals:  Active       PT Problem       PT Goal 1       Start:  08/29/24    Expected End:  10/24/24       By discharge, patient will:  Demonstrate independence with home exercise program  Tolerate increased exercise without adverse reaction  Demonstrate improved posture & proper body mechanics throughout session  Increase left ankle ROM to pain free + WNL  Increase left snkle strength to pain free + 5/5 throughout  Report decrease in pain by > 2 points to meet the MCID  Increase score of LEFS by > 9 points to meet the MCID  Ascend / descend stairs without an increase in symptoms  Ambulate x 1000' without an increase in symptoms  Pass return to sport battery               Lavelle Sandhu PT, DPT, OCS, C-OMPT, ITPT

## 2024-12-31 ENCOUNTER — TREATMENT (OUTPATIENT)
Dept: PHYSICAL THERAPY | Facility: HOSPITAL | Age: 24
End: 2024-12-31
Payer: COMMERCIAL

## 2024-12-31 DIAGNOSIS — M25.672 STIFFNESS OF LEFT ANKLE, NOT ELSEWHERE CLASSIFIED: ICD-10-CM

## 2024-12-31 DIAGNOSIS — Z47.89 ENCOUNTER FOR OTHER ORTHOPEDIC AFTERCARE: ICD-10-CM

## 2024-12-31 DIAGNOSIS — S92.352D DISPLACED FRACTURE OF FIFTH METATARSAL BONE, LEFT FOOT, SUBSEQUENT ENCOUNTER FOR FRACTURE WITH ROUTINE HEALING: ICD-10-CM

## 2024-12-31 PROCEDURE — 97016 VASOPNEUMATIC DEVICE THERAPY: CPT | Mod: GP | Performed by: PHYSICAL THERAPIST

## 2024-12-31 PROCEDURE — 97530 THERAPEUTIC ACTIVITIES: CPT | Mod: GP | Performed by: PHYSICAL THERAPIST

## 2024-12-31 PROCEDURE — 97110 THERAPEUTIC EXERCISES: CPT | Mod: GP | Performed by: PHYSICAL THERAPIST

## 2024-12-31 ASSESSMENT — PAIN - FUNCTIONAL ASSESSMENT: PAIN_FUNCTIONAL_ASSESSMENT: 0-10

## 2024-12-31 ASSESSMENT — PAIN SCALES - GENERAL: PAINLEVEL_OUTOF10: 0 - NO PAIN

## 2024-12-31 NOTE — PROGRESS NOTES
"  Physical Therapy  Physical Therapy Treatment Note    Patient Name: Darci Eastman  MRN: [id number]  Today's Date: 12/31/2024  Time Calculation  Start Time: 0700  Stop Time: 0810  Time Calculation (min): 70 min    Insurance:  Visit number: 27 of [place]  Authorization info: no auth  Insurance Type: [facility]    Current Problem  1. Displaced fracture of fifth metatarsal bone, left foot, subsequent encounter for fracture with routine healing  Follow Up In Physical Therapy      2. Encounter for other orthopedic aftercare  Follow Up In Physical Therapy      3. Stiffness of left ankle, not elsewhere classified  Follow Up In Physical Therapy          Precautions:      General  Reason for Referral: s/p ORIF left fifth metatarsal fracture with calcaneus bone graft DOS: 7/15/24  Referred By: Dr. Jamshid Montgomery MD      Pain  Pain Assessment: 0-10  0-10 (Numeric) Pain Score: 0 - No pain    Subjective:   Patient reports he is doing well overall. Pt states he continues to play ultimate but has discomfort towards the end of playing and  feel his explosiveness is not back yet      Performing HEP?: Yes      Objective:         Treatment Performed:  Therapeutic Exercise  Therapeutic Exercise Activity 1: upright bike x6 min (4min TABATA)  Therapeutic Exercise Activity 2: resisted side steeping green TB loop 5 yards x6  Therapeutic Exercise Activity 3: walking DF stretch  Therapeutic Exercise Activity 4: half kneeling calf stretch  Therapeutic Exercise Activity 5: total gym level 1 DL jumps 2 yellow cords x20  Therapeutic Exercise Activity 6: total gym level 1 alternating SL jumps 2 yellow cords x20  Therapeutic Exercise Activity 7: total gym level 1 SL jumps 2 yellow cords x10 each leg    Therapeutic Activity  Therapeutic Activity 1: 20\" box jumps 2x12  Therapeutic Activity 2: sitting to 20\" box jump 5x5  Therapeutic Activity 3: sitting to 20\" box jump 5x5  Therapeutic Activity 4: lateral hops over frida  Therapeutic Activity 5: " lateral decels with ball with reaction and fwd sprint    Modalities  Modality 1: Untimed Vasopneumatic      Assessment:   The focus of the session was functional training. The pt demonstrated Good tolerance to the noted exercises today. The pt is demonstrated Good progress in skilled rehab at this time. The pt is still limited in overall Strength and Pain at this time. The pt continues to be a good candidate for skilled PT, in order to further improve Strength and Pain.     Education: progress plyometrics    Plan:  Sports specific actives     Goals:  Active       PT Problem       PT Goal 1       Start:  08/29/24    Expected End:  10/24/24       By discharge, patient will:  Demonstrate independence with home exercise program  Tolerate increased exercise without adverse reaction  Demonstrate improved posture & proper body mechanics throughout session  Increase left ankle ROM to pain free + WNL  Increase left snkle strength to pain free + 5/5 throughout  Report decrease in pain by > 2 points to meet the MCID  Increase score of LEFS by > 9 points to meet the MCID  Ascend / descend stairs without an increase in symptoms  Ambulate x 1000' without an increase in symptoms  Pass return to sport battery               Lavelle Sandhu PT, DPT, OCS, C-OMPT, ITPT

## 2025-01-07 ENCOUNTER — TREATMENT (OUTPATIENT)
Dept: PHYSICAL THERAPY | Facility: HOSPITAL | Age: 25
End: 2025-01-07
Payer: COMMERCIAL

## 2025-01-07 DIAGNOSIS — Z47.89 ORTHOPEDIC AFTERCARE: ICD-10-CM

## 2025-01-07 DIAGNOSIS — Z47.89 ENCOUNTER FOR OTHER ORTHOPEDIC AFTERCARE: ICD-10-CM

## 2025-01-07 DIAGNOSIS — S92.352D DISPLACED FRACTURE OF FIFTH METATARSAL BONE, LEFT FOOT, SUBSEQUENT ENCOUNTER FOR FRACTURE WITH ROUTINE HEALING: Primary | ICD-10-CM

## 2025-01-07 DIAGNOSIS — M25.672 STIFFNESS OF LEFT ANKLE, NOT ELSEWHERE CLASSIFIED: ICD-10-CM

## 2025-01-07 PROCEDURE — 97110 THERAPEUTIC EXERCISES: CPT | Mod: GP | Performed by: PHYSICAL THERAPIST

## 2025-01-07 PROCEDURE — 97016 VASOPNEUMATIC DEVICE THERAPY: CPT | Mod: GP | Performed by: PHYSICAL THERAPIST

## 2025-01-07 PROCEDURE — 97112 NEUROMUSCULAR REEDUCATION: CPT | Mod: GP | Performed by: PHYSICAL THERAPIST

## 2025-01-07 ASSESSMENT — PAIN SCALES - GENERAL: PAINLEVEL_OUTOF10: 2

## 2025-01-07 ASSESSMENT — PAIN - FUNCTIONAL ASSESSMENT: PAIN_FUNCTIONAL_ASSESSMENT: 0-10

## 2025-01-07 NOTE — PROGRESS NOTES
"  Physical Therapy  Physical Therapy Treatment Note    Patient Name: Darci Eastman  MRN: [id number]  Today's Date: 1/7/2025  Time Calculation  Start Time: 0700  Stop Time: 0810  Time Calculation (min): 70 min    Insurance:  Visit number: [place]  Authorization info: no auth  Insurance Type: [facility]    Current Problem  1. Displaced fracture of fifth metatarsal bone, left foot, subsequent encounter for fracture with routine healing        2. Encounter for other orthopedic aftercare        3. Stiffness of left ankle, not elsewhere classified        4. Orthopedic aftercare              Precautions:      General  Reason for Referral: s/p ORIF left fifth metatarsal fracture with calcaneus bone graft DOS: 7/15/24  Referred By: Dr. Jamshid Montgomery MD      Pain  Pain Assessment: 0-10  0-10 (Numeric) Pain Score: 2  Pain Location: Leg  Pain Orientation: Right, Posterior (hamstring)    Subjective:   Pt reports he is feeling a lot better in terms of his foot but was doing plyos at the gym and noticed some pain in hamstring      Performing HEP?: Yes      Objective:   Assessment of hamstring pain  Clean exam except pain with MMT at 30 deg knee flexion      Treatment Performed:  Therapeutic Exercise  Therapeutic Exercise Activity 1: upright bike x6 min (4min TABATA)  Therapeutic Exercise Activity 2: resisted side steeping green TB loop 5 yards x6  Therapeutic Exercise Activity 3: walking DF stretch  Therapeutic Exercise Activity 4: half kneeling calf stretch  Therapeutic Exercise Activity 5: SL stance on 6\" box with contralateral hip extension+abduction 1x15 each  Therapeutic Exercise Activity 6: single leg squat with contralateral hip abduction vs PBvs wall 1x12    Therapeutic Activity  Therapeutic Activity 1: hex bar DL with hip [place]  Therapeutic Activity 2: single leg balance on RADHA with kieser chops    Modalities  Modality 1: Untimed Vasopneumatic (contrast)      Assessment:   The focus of the session was functional " training. The pt demonstrated Good tolerance to the noted exercises today. The pt is demonstrated Good progress in skilled rehab at this time. The pt is still limited in overall Strength and Pain at this time. The pt continues to be a good candidate for skilled PT, in order to further improve Strength and Pain.     Education: progress plyometrics    Plan:  Sports specific actives     Goals:  Active       PT Problem       PT Goal 1       Start:  08/29/24    Expected End:  10/24/24       By discharge, patient will:  Demonstrate independence with home exercise program  Tolerate increased exercise without adverse reaction  Demonstrate improved posture & proper body mechanics throughout session  Increase left ankle ROM to pain free + WNL  Increase left snkle strength to pain free + 5/5 throughout  Report decrease in pain by > 2 points to meet the MCID  Increase score of LEFS by > 9 points to meet the MCID  Ascend / descend stairs without an increase in symptoms  Ambulate x 1000' without an increase in symptoms  Pass return to sport battery                 Lavelle Sandhu PT, DPT, OCS, C-OMPT, ITPT

## 2025-01-21 ENCOUNTER — TREATMENT (OUTPATIENT)
Dept: PHYSICAL THERAPY | Facility: HOSPITAL | Age: 25
End: 2025-01-21
Payer: COMMERCIAL

## 2025-01-21 DIAGNOSIS — S92.352D DISPLACED FRACTURE OF FIFTH METATARSAL BONE OF LEFT FOOT WITH ROUTINE HEALING: ICD-10-CM

## 2025-01-21 DIAGNOSIS — M25.672 STIFFNESS OF LEFT ANKLE, NOT ELSEWHERE CLASSIFIED: ICD-10-CM

## 2025-01-21 DIAGNOSIS — Z47.89 ENCOUNTER FOR OTHER ORTHOPEDIC AFTERCARE: ICD-10-CM

## 2025-01-21 DIAGNOSIS — S92.352D DISPLACED FRACTURE OF FIFTH METATARSAL BONE, LEFT FOOT, SUBSEQUENT ENCOUNTER FOR FRACTURE WITH ROUTINE HEALING: Primary | ICD-10-CM

## 2025-01-21 PROCEDURE — 97530 THERAPEUTIC ACTIVITIES: CPT | Mod: GP | Performed by: PHYSICAL THERAPIST

## 2025-01-21 PROCEDURE — 97110 THERAPEUTIC EXERCISES: CPT | Mod: GP | Performed by: PHYSICAL THERAPIST

## 2025-01-21 ASSESSMENT — PAIN - FUNCTIONAL ASSESSMENT
PAIN_FUNCTIONAL_ASSESSMENT: 0-10
PAIN_FUNCTIONAL_ASSESSMENT: 0-10

## 2025-01-21 ASSESSMENT — PAIN SCALES - GENERAL
PAINLEVEL_OUTOF10: 0 - NO PAIN
PAINLEVEL_OUTOF10: 0 - NO PAIN

## 2025-01-21 NOTE — PROGRESS NOTES
"  Physical Therapy  Physical Therapy Treatment Note    Patient Name: Darci Eastman  MRN: [id number]  Today's Date: 1/21/2025  Time Calculation  Start Time: 0645  Stop Time: 0800  Time Calculation (min): 75 min    Insurance:  Visit number: 29 of [place]  Authorization info: no auth  Insurance Type: [facility]    Current Problem  1. Displaced fracture of fifth metatarsal bone, left foot, subsequent encounter for fracture with routine healing  Follow Up In Physical Therapy      2. Encounter for other orthopedic aftercare  Follow Up In Physical Therapy      3. Stiffness of left ankle, not elsewhere classified  Follow Up In Physical Therapy      4. Displaced fracture of fifth metatarsal bone of left foot with routine healing                General  Reason for Referral: s/p ORIF left fifth metatarsal fracture with calcaneus bone graft DOS: 7/15/24  Referred By: Dr. Jamshid Montgomery MDReason for Referral: s/p ORIF left fifth metatarsal fracture with calcaneus bone graft DOS: 7/15/24  Referred By: Dr. Jamshid Montgomery MD      Pain  Pain Assessment: 0-10  0-10 (Numeric) Pain Score: 0 - No pain    Subjective:   Pt arrived to therapy reporting overall he is feeling well. Pt states that he played on Friday and Saturday and noticed his posterior tib and anterior tib continues to get fatigued      Performing HEP?: Yes      Objective:   Assessment of hamstring pain  Clean exam except pain with MMT at 30 deg knee flexion      Treatment Performed:  Therapeutic Exercise  Therapeutic Exercise Activity 1: upright bike x6 min (4min TABATA)  Therapeutic Exercise Activity 2: resisted side steeping green TB loop 5 yards x6  Therapeutic Exercise Activity 3: walking DF stretch  Therapeutic Exercise Activity 4: half kneeling calf stretch  Therapeutic Exercise Activity 5: SL stance on 6\" box with contralateral hip extension+abduction 1x15 each  Therapeutic Exercise Activity 6: single leg squat with contralateral hip abduction vs PBvs wall " 1x12  Therapeutic Exercise Activity 7: runner's hip flexor stretch    Therapeutic Activity  Therapeutic Activity 1: sled push with lateral cuts 45lb x10 yards  Therapeutic Activity 2: lateral sled pulls 45  Therapeutic Activity 3: discussion on arthokinematics and pain science           Assessment:   The focus of the session was functional training. The pt demonstrated Good tolerance to the noted exercises today. The pt is demonstrated Good progress in skilled rehab at this time. The pt is still limited in overall Strength and Pain at this time. The pt continues to be a good candidate for skilled PT, in order to further improve Strength and Pain.     Education: progress plyometrics    Plan:  Sports specific actives     Goals:  Active       PT Problem       PT Goal 1       Start:  08/29/24    Expected End:  10/24/24       By discharge, patient will:  Demonstrate independence with home exercise program  Tolerate increased exercise without adverse reaction  Demonstrate improved posture & proper body mechanics throughout session  Increase left ankle ROM to pain free + WNL  Increase left snkle strength to pain free + 5/5 throughout  Report decrease in pain by > 2 points to meet the MCID  Increase score of LEFS by > 9 points to meet the MCID  Ascend / descend stairs without an increase in symptoms  Ambulate x 1000' without an increase in symptoms  Pass return to sport battery                 Lavelle Sandhu PT, DPT, OCS, C-OMPT, ITPT

## 2025-01-28 ENCOUNTER — APPOINTMENT (OUTPATIENT)
Dept: PHYSICAL THERAPY | Facility: HOSPITAL | Age: 25
End: 2025-01-28
Payer: COMMERCIAL

## 2025-02-04 ENCOUNTER — APPOINTMENT (OUTPATIENT)
Dept: PHYSICAL THERAPY | Facility: HOSPITAL | Age: 25
End: 2025-02-04
Payer: COMMERCIAL

## 2025-02-11 ENCOUNTER — APPOINTMENT (OUTPATIENT)
Dept: PHYSICAL THERAPY | Facility: HOSPITAL | Age: 25
End: 2025-02-11
Payer: COMMERCIAL

## 2025-02-17 ENCOUNTER — TREATMENT (OUTPATIENT)
Dept: PHYSICAL THERAPY | Facility: HOSPITAL | Age: 25
End: 2025-02-17
Payer: COMMERCIAL

## 2025-02-17 DIAGNOSIS — S92.352D DISPLACED FRACTURE OF FIFTH METATARSAL BONE, LEFT FOOT, SUBSEQUENT ENCOUNTER FOR FRACTURE WITH ROUTINE HEALING: ICD-10-CM

## 2025-02-17 DIAGNOSIS — M25.672 STIFFNESS OF LEFT ANKLE, NOT ELSEWHERE CLASSIFIED: ICD-10-CM

## 2025-02-17 DIAGNOSIS — Z47.89 ENCOUNTER FOR OTHER ORTHOPEDIC AFTERCARE: ICD-10-CM

## 2025-02-17 PROCEDURE — 97110 THERAPEUTIC EXERCISES: CPT | Mod: GP | Performed by: PHYSICAL THERAPIST

## 2025-02-17 PROCEDURE — 97530 THERAPEUTIC ACTIVITIES: CPT | Mod: GP | Performed by: PHYSICAL THERAPIST

## 2025-02-17 ASSESSMENT — PAIN - FUNCTIONAL ASSESSMENT: PAIN_FUNCTIONAL_ASSESSMENT: 0-10

## 2025-02-17 ASSESSMENT — PAIN SCALES - GENERAL: PAINLEVEL_OUTOF10: 0 - NO PAIN

## 2025-02-17 NOTE — PROGRESS NOTES
Physical Therapy  Physical Therapy Treatment Note    Patient Name: Darci Eastman  MRN: [id number]  Today's Date: 2/17/2025  Time Calculation  Start Time: 1630  Stop Time: 1715  Time Calculation (min): 45 min    Insurance:  Visit number: 30 of [place]  Authorization info: no auth  Insurance Type: [facility]    Current Problem  1. Displaced fracture of fifth metatarsal bone, left foot, subsequent encounter for fracture with routine healing  Follow Up In Physical Therapy      2. Stiffness of left ankle, not elsewhere classified  Follow Up In Physical Therapy      3. Encounter for other orthopedic aftercare  Follow Up In Physical Therapy              General   Reason for Referral: s/p ORIF left fifth metatarsal fracture with calcaneus bone graft DOS: 7/15/24  Referred By: Dr. Jamshid Montgomery MD      Pain  Pain Assessment: 0-10  0-10 (Numeric) Pain Score: 0 - No pain    Subjective:   Pt arrived to therapy after a long hiatus secondary to him feeling well. Pt reports things are going very well except at the end of playing he still notices some discomfort in the surgical site and anterior ankle. Pt reports he also notices some pain in his back       Performing HEP?: Yes      Objective:   Assessment of hamstring pain  Clean exam except pain with MMT at 30 deg knee flexion      Treatment Performed:  Therapeutic Exercise:      min  upright bike x6 min (4min TABATA)   resisted side steeping green TB loop 5 yards x6   walking DF stretch  half kneeling calf stretch  Banded good morning  Dead bug  Single leg bridge    Therapeutic Activity:      min  Majority of time spent discussing load management  Educated patient on dead lift technique    Manual Therapy:      min      Neuromuscular Re-education:     min      Therapeutic Modalities:     min      Other:       min         Assessment:   The focus of the session was functional training and lifting technique and discussion about POC. At this point I want patient to continue to  participate fully in practice and games and follow up in 1 month to reassess . The pt is demonstrated good progress in skilled rehab at this time. The pt is still limited in overall  sports specific activities  at this time. The pt continues to be a good candidate for skilled PT, in order to further improve sports tolerance.     Education: progress plyometrics    Plan:  Sports specific actives     Goals:  Active       PT Problem       PT Goal 1       Start:  08/29/24    Expected End:  10/24/24       By discharge, patient will:  Demonstrate independence with home exercise program  Tolerate increased exercise without adverse reaction  Demonstrate improved posture & proper body mechanics throughout session  Increase left ankle ROM to pain free + WNL  Increase left snkle strength to pain free + 5/5 throughout  Report decrease in pain by > 2 points to meet the MCID  Increase score of LEFS by > 9 points to meet the MCID  Ascend / descend stairs without an increase in symptoms  Ambulate x 1000' without an increase in symptoms  Pass return to sport battery                   Lavelle Sandhu PT, DPT, OCS, C-OMPT, ITPT

## 2025-02-18 ENCOUNTER — APPOINTMENT (OUTPATIENT)
Dept: PHYSICAL THERAPY | Facility: HOSPITAL | Age: 25
End: 2025-02-18
Payer: COMMERCIAL

## 2025-02-25 ENCOUNTER — OFFICE VISIT (OUTPATIENT)
Dept: ORTHOPEDIC SURGERY | Facility: CLINIC | Age: 25
End: 2025-02-25
Payer: COMMERCIAL

## 2025-02-25 ENCOUNTER — HOSPITAL ENCOUNTER (OUTPATIENT)
Dept: RADIOLOGY | Facility: CLINIC | Age: 25
Discharge: HOME | End: 2025-02-25
Payer: COMMERCIAL

## 2025-02-25 ENCOUNTER — APPOINTMENT (OUTPATIENT)
Dept: ORTHOPEDIC SURGERY | Facility: CLINIC | Age: 25
End: 2025-02-25
Payer: COMMERCIAL

## 2025-02-25 DIAGNOSIS — S92.352A CLOSED FRACTURE OF BASE OF FIFTH METATARSAL BONE OF LEFT FOOT, INITIAL ENCOUNTER: Primary | ICD-10-CM

## 2025-02-25 DIAGNOSIS — S92.352A CLOSED FRACTURE OF BASE OF FIFTH METATARSAL BONE OF LEFT FOOT, INITIAL ENCOUNTER: ICD-10-CM

## 2025-02-25 PROCEDURE — 73630 X-RAY EXAM OF FOOT: CPT | Mod: LT

## 2025-02-25 PROCEDURE — 99213 OFFICE O/P EST LOW 20 MIN: CPT | Performed by: STUDENT IN AN ORGANIZED HEALTH CARE EDUCATION/TRAINING PROGRAM

## 2025-02-25 ASSESSMENT — PAIN - FUNCTIONAL ASSESSMENT: PAIN_FUNCTIONAL_ASSESSMENT: NO/DENIES PAIN

## 2025-02-25 NOTE — PROGRESS NOTES
ORTHOPAEDIC SURGERY PROGRESS NOTE    Chief Complaint:  Left foot pain    History Of Present Illness  Darci Eastman is a 25 y.o. male who presents for evaluation of left foot pain as a referral from Dr. Chan.  Patient initially sustained an injury to his left foot and October 2023.  Portions of the history are provided both by the patient as well as EMR and review of care everywhere.  This was treated conservatively.  Patient was later seen in January 2024 with mild persistent pain.  He was then seen on 06/17/2024 for acute on chronic left foot pain.  He sustained a awkward landing while playing ultimate Frisbee from 06/14/2024.  He actually thinks he was going up for the [employer] when he noticed pain.  He was seen in an urgent care setting time and made nonweightbearing in his left lower extremity in a fracture boot.    Patient was intermittently treated for presumed peroneal tendinitis after attending a walk-in therapy student clinic at [employer].  He has been able to continue playing but has noticed a nagging pain.  He has been able to play through the pain.    He currently reports minimal with pain nonweightbearing.  He has been compliant with nonweightbearing restrictions in a tall walking boot.  He has had a recent MRI and is here for review.    Patient does not use tobacco products.  He has recently graduated and is on the job hunt.    07/31/2024: Patient returns s/p ORIF left fifth metatarsal fracture with calcaneus bone graft from 07/15/2024.  Patient has been compliant with nonweightbearing restrictions utilizing a knee scooter.  He has been on aspirin for DVT prophylaxis.  He has had noted improvement in his right foot pain from prior to surgery.  He has been mobilizing his right foot.  He is not reporting any numbness in his left foot.  He is maintained on aspirin for DVT prophylaxis and VitD/Ca.    08/13/2024: Patient returns s/p ORIF left fifth metatarsal fracture with calcaneus bone graft from  07/15/2024.  Patient is reporting 3 out of 10 pain in the left foot, that is gradually improving.  He has been compliant with nonweightbearing restrictions utilizing a knee scooter.  He has been on aspirin for DVT prophylaxis.  He notes continued improvement with respect to his right foot pain.  He denies numbness, tingling or weakness in the LLE.    09/10/2024: Patient returns s/p ORIF left fifth metatarsal fracture with calcaneus bone graft from 07/15/2024.  He is currently reporting 2 out of 10 pain.  He has been in physical therapy.  He has fully transitioned out of the walking boot, he continues to note some pain particularly with lateral movement.  He is comfortable walking but does not yet fully trust his foot for support.    10/08/2024: Patient returns s/p ORIF left fifth metatarsal fracture with calcaneus bone graft from 07/15/2024.  He is currently reporting no pain.  He has continued in physical therapy and has been steadily increasing his impact activity with the [employer] apparatus.  He believes the majority of his foot pain is related to soft tissue discomfort and he has been steadily improving.  He is anticipating tryouts in the beginning of November and does not yet fully trust his foot.    11/20/2024: Patient returns s/p ORIF left fifth metatarsal fracture with calcaneus bone graft from 07/15/2024.  Patient is currently reporting no pain.  He was able to compete in an invitation only try out in [place] for ultimate [employer].  He has been in physical therapy.  He reports that he has to really try during day-to-day life to put pressure on the foot in a way that he would never walk to have pain.    02/25/2025: Patient returns s/p ORIF left fifth metatarsal fracture with calcaneus bone graft from 07/15/2024.  Patient continues with minimal to no pain at all.  He does have occasional discomfort but symptoms overall continue to improve.  He is quite pleased with his function.     Past Medical History  Past  Medical History:   Diagnosis Date    Closed fracture of base of fifth metatarsal bone of left foot, initial encounter        Surgical History  Recent Surgeries in Orthopaedic Surgery            No cases to display             Social History  Social History     Socioeconomic History    Marital status: Single   Tobacco Use    Smoking status: Never    Smokeless tobacco: Never   Vaping Use    Vaping status: Never Used   Substance and Sexual Activity    Alcohol use: Yes     Alcohol/week: 6.0 standard drinks of alcohol     Types: 6 Standard drinks or equivalent per week    Drug use: Yes     Types: Marijuana     Comment: last use April - no other drugs    Sexual activity: Defer       Family History  Family History   Problem Relation Name Age of Onset    ALS Mother      Hypertension Father      No Known Problems Sister      No Known Problems Brother          Allergies  No Known Allergies    Review of Systems  REVIEW OF SYSTEMS  Constitutional: no unplanned weight loss  Psychiatric: no suicidal ideation  ENT: no vision changes, no sinus problems  Pulmonary: no shortness of breath  Lymphatic: no enlarged lymph nodes  Cardiovascular: no chest pain or shortness of breath  Gastrointestinal: no stomach problems  Genitourinary: no dysuria   Skin: no rashes  Endocrine: no thyroid problems  Neurological: no headache, no numbness  Hematological: no easy bruising  Musculoskeletal: Left foot pain    Physical Exam  PHYSICAL EXAMINATION  Constitutional Exam: well developed and well nourished  Psychiatric Exam: alert and oriented, appropriate mood and behavior  Eye Exam: EOMI  Pulmonary Exam: breathing non-labored, no apparent distress  Lymphatic exam: no appreciable lymphadenopathy in the lower extremities  Cardiovascular exam: RRR to peripheral palpation, DP pulses 2+, PT 2+, toes are pink with good capillary refill, no pitting edema  Skin exam: no open lesions, rashes, abrasions or ulcerations  Neurological exam: sensation to light  touch intact in both lower extremities in peripheral and dermatomal distributions (except for any abnormalities noted in musculoskeletal exam)    Musculoskeletal exam: Left lower extremity examination.  Patient surgical incisions at calcaneus and lateral fifth metatarsal well-healed.  Patient ambulates with a fluid and nonantalgic gait.  Foot appears warm and well-perfused.    Last Recorded Vitals  There were no vitals taken for this visit.    Laboratory Results    No results found for this or any previous visit (from the past 24 hours).    Radiology Results   X-ray imaging 3 view weightbearing left foot reviewed and independently evaluated by me on 02/25/2025 demonstrates osseous bridging of fifth metatarsal plantar plate fixation without margarita-implant fracture, lucency or loosening.    Assessment/Plan:  24-year-old male s/p ORIF left fifth metatarsal fracture with calcaneus bone graft from 07/15/2024 who presents with both clinical and radiographic evidence of healing.  I would recommend the patient continue weightbearing to his tolerance in his left lower extremity.  I will continue to have no formal restrictions for him and he may continue with physical therapy.  I reviewed with the patient that is certainly possible he could have problems from the hardware down the road but given his continued high level of play would not recommend prophylactic plate removal at this time.  I would plan to see the patient back for his 1 year follow-up.  Upon return patient would require three-view weightbearing left foot.    Jamshid Montgomery MD, LAURA  Department of Orthopaedic Surgery  [facility]    The diagnosis and treatment plan were reviewed with the patient. All questions were answered. The patient verbalized understanding of the treatment plan. There were no barriers to understanding identified.    Note dictated with [email or web address] software.  Completed without full type  editing and sent to avoid delay.

## 2025-03-11 ENCOUNTER — APPOINTMENT (OUTPATIENT)
Dept: ORTHOPEDIC SURGERY | Facility: CLINIC | Age: 25
End: 2025-03-11
Payer: COMMERCIAL

## 2025-03-13 ENCOUNTER — TREATMENT (OUTPATIENT)
Dept: PHYSICAL THERAPY | Facility: HOSPITAL | Age: 25
End: 2025-03-13
Payer: COMMERCIAL

## 2025-03-13 DIAGNOSIS — M25.672 STIFFNESS OF LEFT ANKLE, NOT ELSEWHERE CLASSIFIED: ICD-10-CM

## 2025-03-13 DIAGNOSIS — Z47.89 ENCOUNTER FOR OTHER ORTHOPEDIC AFTERCARE: ICD-10-CM

## 2025-03-13 DIAGNOSIS — S92.352D DISPLACED FRACTURE OF FIFTH METATARSAL BONE, LEFT FOOT, SUBSEQUENT ENCOUNTER FOR FRACTURE WITH ROUTINE HEALING: ICD-10-CM

## 2025-03-13 PROCEDURE — 97530 THERAPEUTIC ACTIVITIES: CPT | Mod: GP | Performed by: PHYSICAL THERAPIST

## 2025-03-13 ASSESSMENT — PAIN SCALES - GENERAL: PAINLEVEL_OUTOF10: 0 - NO PAIN

## 2025-03-13 ASSESSMENT — PAIN - FUNCTIONAL ASSESSMENT: PAIN_FUNCTIONAL_ASSESSMENT: 0-10

## 2025-03-17 NOTE — PROGRESS NOTES
Physical Therapy  Physical Therapy Treatment Note    Patient Name: Darci Eastman  MRN: [id number]  Today's Date: 3/13/2025  Time Calculation  Start Time: 1630  Stop Time: 1715  Time Calculation (min): 45 min    Insurance:  Visit number: 31 of [place]  Authorization info: no auth  Insurance Type: [facility]    Current Problem  1. Displaced fracture of fifth metatarsal bone, left foot, subsequent encounter for fracture with routine healing  Follow Up In Physical Therapy      2. Stiffness of left ankle, not elsewhere classified  Follow Up In Physical Therapy      3. Encounter for other orthopedic aftercare  Follow Up In Physical Therapy              General   Reason for Referral: s/p ORIF left fifth metatarsal fracture with calcaneus bone graft DOS: 7/15/24  Referred By: Dr. Jamshid Montgomery MD      Pain  Pain Assessment: 0-10  0-10 (Numeric) Pain Score: 0 - No pain    Subjective:   Pt arrived to therapy reporting overall he is doing well. Pt states his only concern is when he is playing long periods of time and lateral cutting he continues to feel discomfort over  the sx site. Pt states he has been trying to utilize his carbon fiber inserts and feels improved explosiveness but has increase pain. Pt is debating on getting a more stable cleat, pt had a follow up with surgeon and everything looks good      Performing HEP?: Yes      Objective:         Treatment Performed:  Therapeutic Exercise:      min      Therapeutic Activity:      min  Majority of time spent discussing load management  Long discussion on foot wear and possible new cleats and inserts  Review of anatomy   Discussion about purchasing prostretch   Educated patient on dead lift technique    Manual Therapy:      min      Neuromuscular Re-education:     min      Therapeutic Modalities:     min      Other:       min         Assessment:   Majority of session spent on education and foot wear recommendations. We will reassess in 1 month     Education:  reassess    Plan:  Sports specific actives     Goals:  Active       PT Problem       PT Goal 1       Start:  08/29/24    Expected End:  10/24/24       By discharge, patient will:  Demonstrate independence with home exercise program  Tolerate increased exercise without adverse reaction  Demonstrate improved posture & proper body mechanics throughout session  Increase left ankle ROM to pain free + WNL  Increase left snkle strength to pain free + 5/5 throughout  Report decrease in pain by > 2 points to meet the MCID  Increase score of LEFS by > 9 points to meet the MCID  Ascend / descend stairs without an increase in symptoms  Ambulate x 1000' without an increase in symptoms  Pass return to sport battery                   Lavelle Sandhu PT, DPT, OCS, C-OMPT, ITPT

## 2025-07-22 ENCOUNTER — APPOINTMENT (OUTPATIENT)
Dept: ORTHOPEDIC SURGERY | Facility: CLINIC | Age: 25
End: 2025-07-22
Payer: COMMERCIAL

## 2025-08-05 ENCOUNTER — APPOINTMENT (OUTPATIENT)
Dept: ORTHOPEDIC SURGERY | Facility: CLINIC | Age: 25
End: 2025-08-05
Payer: COMMERCIAL

## 2025-08-05 ENCOUNTER — HOSPITAL ENCOUNTER (OUTPATIENT)
Dept: RADIOLOGY | Facility: CLINIC | Age: 25
Discharge: HOME | End: 2025-08-05
Payer: COMMERCIAL

## 2025-08-05 DIAGNOSIS — S92.352A CLOSED FRACTURE OF BASE OF FIFTH METATARSAL BONE OF LEFT FOOT, INITIAL ENCOUNTER: ICD-10-CM

## 2025-08-05 PROCEDURE — 99213 OFFICE O/P EST LOW 20 MIN: CPT | Performed by: STUDENT IN AN ORGANIZED HEALTH CARE EDUCATION/TRAINING PROGRAM

## 2025-08-05 PROCEDURE — 99212 OFFICE O/P EST SF 10 MIN: CPT | Performed by: STUDENT IN AN ORGANIZED HEALTH CARE EDUCATION/TRAINING PROGRAM

## 2025-08-05 PROCEDURE — 73630 X-RAY EXAM OF FOOT: CPT | Mod: LT

## 2025-08-05 PROCEDURE — 73630 X-RAY EXAM OF FOOT: CPT | Mod: LEFT SIDE | Performed by: RADIOLOGY

## 2025-08-05 ASSESSMENT — PAIN SCALES - GENERAL: PAINLEVEL_OUTOF10: 0 - NO PAIN

## 2025-08-05 ASSESSMENT — PAIN DESCRIPTION - DESCRIPTORS: DESCRIPTORS: ACHING;DULL

## 2025-08-05 ASSESSMENT — PAIN - FUNCTIONAL ASSESSMENT: PAIN_FUNCTIONAL_ASSESSMENT: 0-10

## 2025-08-06 NOTE — PROGRESS NOTES
ORTHOPAEDIC SURGERY PROGRESS NOTE    Chief Complaint:  Left foot pain    History Of Present Illness  Darci Eastman is a 25 y.o. male who presents for evaluation of left foot pain as a referral from Dr. Chan.  Patient initially sustained an injury to his left foot and October 2023.  Portions of the history are provided both by the patient as well as EMR and review of care everywhere.  This was treated conservatively.  Patient was later seen in January 2024 with mild persistent pain.  He was then seen on 06/17/2024 for acute on chronic left foot pain.  He sustained a awkward landing while playing ultimate Frisbee from 06/14/2024.  He actually thinks he was going up for the [employer] when he noticed pain.  He was seen in an urgent care setting time and made nonweightbearing in his left lower extremity in a fracture boot.    Patient was intermittently treated for presumed peroneal tendinitis after attending a walk-in therapy student clinic at [employer].  He has been able to continue playing but has noticed a nagging pain.  He has been able to play through the pain.    He currently reports minimal with pain nonweightbearing.  He has been compliant with nonweightbearing restrictions in a tall walking boot.  He has had a recent MRI and is here for review.    Patient does not use tobacco products.  He has recently graduated and is on the job hunt.    07/31/2024: Patient returns s/p ORIF left fifth metatarsal fracture with calcaneus bone graft from 07/15/2024.  Patient has been compliant with nonweightbearing restrictions utilizing a knee scooter.  He has been on aspirin for DVT prophylaxis.  He has had noted improvement in his right foot pain from prior to surgery.  He has been mobilizing his right foot.  He is not reporting any numbness in his left foot.  He is maintained on aspirin for DVT prophylaxis and VitD/Ca.    08/13/2024: Patient returns s/p ORIF left fifth metatarsal fracture with calcaneus bone graft from  07/15/2024.  Patient is reporting 3 out of 10 pain in the left foot, that is gradually improving.  He has been compliant with nonweightbearing restrictions utilizing a knee scooter.  He has been on aspirin for DVT prophylaxis.  He notes continued improvement with respect to his right foot pain.  He denies numbness, tingling or weakness in the LLE.    09/10/2024: Patient returns s/p ORIF left fifth metatarsal fracture with calcaneus bone graft from 07/15/2024.  He is currently reporting 2 out of 10 pain.  He has been in physical therapy.  He has fully transitioned out of the walking boot, he continues to note some pain particularly with lateral movement.  He is comfortable walking but does not yet fully trust his foot for support.    10/08/2024: Patient returns s/p ORIF left fifth metatarsal fracture with calcaneus bone graft from 07/15/2024.  He is currently reporting no pain.  He has continued in physical therapy and has been steadily increasing his impact activity with the [employer] apparatus.  He believes the majority of his foot pain is related to soft tissue discomfort and he has been steadily improving.  He is anticipating tryouts in the beginning of November and does not yet fully trust his foot.    11/20/2024: Patient returns s/p ORIF left fifth metatarsal fracture with calcaneus bone graft from 07/15/2024.  Patient is currently reporting no pain.  He was able [place] compete [place] [facility]  He has been in physical therapy.  He reports that he has to really try during day-to-day life to put pressure on the foot in a way that he would never walk to have pain.    02/25/2025: Patient returns s/p ORIF left fifth metatarsal fracture with calcaneus bone graft from 07/15/2024.  Patient continues with minimal to no pain at all.  He does have occasional discomfort but symptoms overall continue to improve.  He is quite pleased with his function.    08/06/2025: Patient returns  s/p ORIF left fifth metatarsal fracture with calcaneus bone graft from 07/15/2024. Patient has previously returned to a high level of competitive ultimate Frisbee play.  He is currently reporting no pain.  He does notice some irritation with direct contact at the incision site which she attributes to scar tissue.  He does not note any plate prominence.     Past Medical History  Past Medical History:   Diagnosis Date    Closed fracture of base of fifth metatarsal bone of left foot, initial encounter        Surgical History  Recent Surgeries in Orthopaedic Surgery            No cases to display             Social History  Social History     Socioeconomic History    Marital status: Single   Tobacco Use    Smoking status: Never    Smokeless tobacco: Never   Vaping Use    Vaping status: Never Used   Substance and Sexual Activity    Alcohol use: Yes     Alcohol/week: 6.0 standard drinks of alcohol     Types: 6 Standard drinks or equivalent per week    Drug use: Yes     Types: Marijuana     Comment: last use April - no other drugs    Sexual activity: Defer       Family History  Family History   Problem Relation Name Age of Onset    ALS Mother      Hypertension Father      No Known Problems Sister      No Known Problems Brother          Allergies  No Known Allergies    Review of Systems  REVIEW OF SYSTEMS  Constitutional: no unplanned weight loss  Psychiatric: no suicidal ideation  ENT: no vision changes, no sinus problems  Pulmonary: no shortness of breath  Lymphatic: no enlarged lymph nodes  Cardiovascular: no chest pain or shortness of breath  Gastrointestinal: no stomach problems  Genitourinary: no dysuria   Skin: no rashes  Endocrine: no thyroid problems  Neurological: no headache, no numbness  Hematological: no easy bruising  Musculoskeletal: Left foot pain    Physical Exam  PHYSICAL EXAMINATION  Constitutional Exam: well developed and well nourished  Psychiatric Exam: alert and oriented, appropriate mood and  behavior  Eye Exam: EOMI  Pulmonary Exam: breathing non-labored, no apparent distress  Lymphatic exam: no appreciable lymphadenopathy in the lower extremities  Cardiovascular exam: RRR to peripheral palpation, DP pulses 2+, PT 2+, toes are pink with good capillary refill, no pitting edema  Skin exam: no open lesions, rashes, abrasions or ulcerations  Neurological exam: sensation to light touch intact in both lower extremities in peripheral and dermatomal distributions (except for any abnormalities noted in musculoskeletal exam)    Musculoskeletal exam: Left lower extremity examination.  Incision lateral border fifth at the junction of the glabrous and on glabra skin well-healed.  Nontender to palpation overlying the plate.  Supple and pain-free ankle, subtalar and midtarsal joint range of motion.  Sensation is grossly intact to light touch in the distal extremity with intact plantarflexion, dorsiflexion, EHL and 2+ DP/PT pulses palpated.      Last Recorded Vitals  There were no vitals taken for this visit.    Laboratory Results    No results found for this or any previous visit (from the past 24 hours).    Radiology Results   X-ray imaging 3 view weightbearing left foot reviewed and independently evaluated by me on 8/6/2025 demonstrates well-healed fifth metatarsal fracture with plantar plate fixation without margarita-implant fracture, lucency or loosening.    Assessment/Plan:  25-year-old male s/p ORIF left fifth metatarsal fracture with calcaneus bone graft from 07/15/2024 who presents with both clinical and radiographic evidence of healing.  I would recommend the patient continue weightbearing to his tolerance in his left lower extremity.  I have no formal restrictions for him.  We discussed possibility of hardware removal if symptomatic, he is not presently symptomatic and continues to compete at a high level and so would not give strong consideration to elective hardware removal.  I would plan to see the patient  back for 2-year follow-up or sooner if symptoms would worsen.  Upon return patient require three-view weightbearing left foot.    Jamshid Montgomery MD, LAURA  Department of Orthopaedic Surgery  [facility]    The diagnosis and treatment plan were reviewed with the patient. All questions were answered. The patient verbalized understanding of the treatment plan. There were no barriers to understanding identified.    Note dictated with [employer] software.  Completed without full type editing and sent to avoid delay.

## 2026-08-11 ENCOUNTER — APPOINTMENT (OUTPATIENT)
Dept: ORTHOPEDIC SURGERY | Facility: CLINIC | Age: 26
End: 2026-08-11
Payer: COMMERCIAL

## (undated) DEVICE — KWIRE, 1.6MM X 150MM, SS

## (undated) DEVICE — CUFF, TOURNIQUET, 30 X 4, DUAL PORT/SNGL BLADDER, DISP, LF

## (undated) DEVICE — Device

## (undated) DEVICE — SOLUTION, IRRIGATION, SODIUM CHLORIDE 0.9%, 1000 ML, POUR BOTTLE

## (undated) DEVICE — DRAPE, C-ARMOR KIT

## (undated) DEVICE — SUTURE, VICRYL, 3-0, 27 IN, SH

## (undated) DEVICE — DRILL BIT, AO, 2.0 X 135MM, SCALED

## (undated) DEVICE — SUTURE, VICRYL, 3-0, 27 IN, CT-2, UNDYED

## (undated) DEVICE — BANDAGE, COFLEX, 6 X 5 YDS, FOAM TAN, STERILE, LF

## (undated) DEVICE — COVER, C-ARM W/CLIPS, OEC GE

## (undated) DEVICE — K WIRE, 1.25 X 150MM

## (undated) DEVICE — DRAPE COVER, C ARM, FLOUROSCAN IMAGING SYS

## (undated) DEVICE — PADDING, UNDERCAST, WEBRIL, 4 IN X 4 YD, REG, NS

## (undated) DEVICE — GLOVE, SURGICAL, PROTEXIS PI ORTHO, 7.5, PF, LF

## (undated) DEVICE — SUTURE, MONOCRYL, 4-0, 27 IN, PS-2, UNDYED

## (undated) DEVICE — COVER, MAYO STAND, W/PAD, 23 IN, DISPOSABLE, PLASTIC, LF, STERILE

## (undated) DEVICE — TIP, SUCTION, YANKAUER, FLEXIBLE

## (undated) DEVICE — DRESSING, GAUZE, 16 PLY, 4 X 4 IN, STERILE

## (undated) DEVICE — COVER, EQUIPMENT, C ARM, W/ STRAPS

## (undated) DEVICE — SUTURE, MONOCRYL, 3-0, 27 IN, SH, UNDYED

## (undated) DEVICE — TOWEL, SURGICAL, NEURO, O/R, 16 X 26, BLUE, STERILE

## (undated) DEVICE — CAUTERY, PENCIL, PUSH BUTTON, SMOKE EVAC, 70MM

## (undated) DEVICE — GLOVE, SURGICAL, PROTEXIS PI BLUE W/NEUTHERA, 7.5, PF, LF

## (undated) DEVICE — PADDING, WEBRIL, UNDERCAST, STERILE, 6 IN

## (undated) DEVICE — DRESSING, GAUZE, PETROLATUM, PATCH, XEROFORM, 1 X 8 IN, STERILE